# Patient Record
Sex: FEMALE | Race: WHITE | Employment: OTHER | URBAN - METROPOLITAN AREA
[De-identification: names, ages, dates, MRNs, and addresses within clinical notes are randomized per-mention and may not be internally consistent; named-entity substitution may affect disease eponyms.]

---

## 2018-02-13 ENCOUNTER — HOSPITAL ENCOUNTER (OUTPATIENT)
Dept: PET IMAGING | Age: 73
Discharge: HOME | End: 2018-02-13
Attending: INTERNAL MEDICINE
Payer: MEDICARE

## 2018-02-13 DIAGNOSIS — R91.1 COIN LESION: ICD-10-CM

## 2018-02-13 PROCEDURE — A9552 F18 FDG: HCPCS

## 2018-03-18 RX ORDER — ATROPINE SULFATE 0.1 MG/ML
0.5 INJECTION INTRAVENOUS
Status: CANCELLED | OUTPATIENT
Start: 2018-03-18 | End: 2018-03-18

## 2018-03-18 RX ORDER — EPINEPHRINE 0.1 MG/ML
1 INJECTION INTRACARDIAC; INTRAVENOUS
Status: CANCELLED | OUTPATIENT
Start: 2018-03-18 | End: 2018-03-18

## 2018-03-18 RX ORDER — DEXTROMETHORPHAN/PSEUDOEPHED 2.5-7.5/.8
1.2 DROPS ORAL
Status: CANCELLED | OUTPATIENT
Start: 2018-03-18

## 2018-03-19 ENCOUNTER — HOSPITAL ENCOUNTER (OUTPATIENT)
Age: 73
Setting detail: OUTPATIENT SURGERY
Discharge: HOME | End: 2018-03-19
Attending: INTERNAL MEDICINE | Admitting: INTERNAL MEDICINE
Payer: MEDICARE

## 2018-03-19 VITALS
HEIGHT: 60 IN | BODY MASS INDEX: 28.72 KG/M2 | OXYGEN SATURATION: 94 % | SYSTOLIC BLOOD PRESSURE: 117 MMHG | WEIGHT: 146.3 LBS | RESPIRATION RATE: 16 BRPM | TEMPERATURE: 97.5 F | DIASTOLIC BLOOD PRESSURE: 59 MMHG | HEART RATE: 86 BPM

## 2018-03-19 PROCEDURE — G0500 MOD SEDAT ENDO SERVICE >5YRS: HCPCS | Performed by: INTERNAL MEDICINE

## 2018-03-19 PROCEDURE — 76040000007: Performed by: INTERNAL MEDICINE

## 2018-03-19 PROCEDURE — 77030020256 HC SOL INJ NACL 0.9%  500ML: Performed by: INTERNAL MEDICINE

## 2018-03-19 PROCEDURE — 74011250636 HC RX REV CODE- 250/636

## 2018-03-19 PROCEDURE — 99153 MOD SED SAME PHYS/QHP EA: CPT | Performed by: INTERNAL MEDICINE

## 2018-03-19 PROCEDURE — 74011250636 HC RX REV CODE- 250/636: Performed by: INTERNAL MEDICINE

## 2018-03-19 RX ORDER — FLUMAZENIL 0.1 MG/ML
0.2 INJECTION INTRAVENOUS
Status: DISCONTINUED | OUTPATIENT
Start: 2018-03-19 | End: 2018-03-19 | Stop reason: HOSPADM

## 2018-03-19 RX ORDER — NALOXONE HYDROCHLORIDE 0.4 MG/ML
0.4 INJECTION, SOLUTION INTRAMUSCULAR; INTRAVENOUS; SUBCUTANEOUS
Status: DISCONTINUED | OUTPATIENT
Start: 2018-03-19 | End: 2018-03-19 | Stop reason: HOSPADM

## 2018-03-19 RX ORDER — FENTANYL CITRATE 50 UG/ML
100 INJECTION, SOLUTION INTRAMUSCULAR; INTRAVENOUS
Status: DISCONTINUED | OUTPATIENT
Start: 2018-03-19 | End: 2018-03-19 | Stop reason: HOSPADM

## 2018-03-19 RX ORDER — SODIUM CHLORIDE 9 MG/ML
100 INJECTION, SOLUTION INTRAVENOUS CONTINUOUS
Status: DISCONTINUED | OUTPATIENT
Start: 2018-03-19 | End: 2018-03-19 | Stop reason: HOSPADM

## 2018-03-19 RX ORDER — MIDAZOLAM HYDROCHLORIDE 1 MG/ML
.5-5 INJECTION, SOLUTION INTRAMUSCULAR; INTRAVENOUS
Status: DISCONTINUED | OUTPATIENT
Start: 2018-03-19 | End: 2018-03-19 | Stop reason: HOSPADM

## 2018-03-19 RX ADMIN — SODIUM CHLORIDE 100 ML/HR: 900 INJECTION, SOLUTION INTRAVENOUS at 09:57

## 2018-03-19 NOTE — CONSULTS
[address] [facility]  MR#: [id number]  : 1945  ACCOUNT #: [de-identified]   DATE OF SERVICE: 2018    HISTORY OF PRESENT ILLNESS:  This is a 59-year-old female who had been referred to me by Dr. Justice Murray for abnormal PET scan done on 2018 at [facility], which showed focal moderately intense radial trace accumulation within the right pelvis associated with sigmoid colon. The patient has never had any colonoscopy previously. She claims that she has 1 bowel movement every second day. She is not sure if she has rectal bleeding or melena because she has been completely blind since . She has no family history of colon cancer. She is a smoker of also almost a pack of cigarettes a day. She has COPD grade III with emphysema. She is otherwise in good health. She is still a smoker. She already had a previous cholecystectomy and eye surgeries. She denies having abdominal pain, dyspepsia, heartburn, nausea, vomiting or dysphagia. Her weight has been stable. It was initially thought that she may have some kind of a tumor in her lungs. She does not have diabetes, coronary artery disease, hypertension. She does not drink alcohol. ALLERGIES:  NO KNOWN DRUG ALLERGIES. HOME MEDICATIONS:  Mostly is Anoro Ellipta inhalation once daily. She denies any chest pain. No stroke, paralysis, numbness, loss of consciousness. PHYSICAL EXAMINATION  GENERAL:  We have a 59-year-old  female who appears to be in no distress. She is completely blind. VITAL SIGNS:  She weighs 146 pounds, temperature 96.3, pulse 90-94, blood pressure 96/74, breathing 23-16, saturation 94-96% on 2 liters per minute. SKIN:  Normal.  No evidence of any rash. HEENT:  Remarkable for multiple previous eye surgeries and scars. She is blind. The sclerae are anicteric. The pupils are equal, round and reactive to light.   The oropharyngeal cavity is very dry with pink mucous membrane. Normal teeth. NECK:  Supple. No palpable mass on auscultation. LUNGS:  Clear to auscultation. HEART:  Cardiac rhythm is regular. S1, S2 normal.  No murmur. ABDOMEN:  Nondistended, soft, nontender, no mass or organomegaly. Bowel sounds are normal.  EXTREMITIES:  Unremarkable. CONCLUSION:  This patient has an abnormal PET scan in the region of the right lower quadrant suggestive of mass in the sigmoid. We are going to do a colonoscopy as she never had one. Her CBC and occult blood in the stools have been normal or negative. I explained to the patient the procedure of colonoscopy and the risks involved, which include, but not limited to bleeding, perforation, reaction to medication or not being able to find anything or that she can miss something if the bowel prep has not been well done or that her bowels are unusually difficult and tortuous. She agreed to proceed. Further note to follow. NICK Britton MD MBE / CESARIO  D: 03/19/2018 11:14     T: 03/19/2018 12:10  JOB #: [id number]  CC: Carlo Barr MD  CC: Lo Crooks MD  CC: Lori You MD

## 2018-03-19 NOTE — H&P
Patient is a 67 y.o. female with a history of COPD was found to have a mas in the RLQ possibly related to the sigmoid on CT scan she is asymptomatic and never had colonoscopy. She has one bm every 2 days. Had previous cholecystectomy. Patient denied having dyspepsia nausea vomiting, dysphagia or abdominal pain, She lost 4 to 5 lbs weight loss after her   . She is a smoker one pack a day. Patient denies heavy ETOH or recent NSAIDS. Patient denies any known history of peptic ulcer disease or chronic liver disease. No recent upper endoscopy or colonoscopy. PET scan  shows metabolic activity with SUV of 9.9  in the right pelvis associated with the sigmoid colon. The patient has family history of colon cancer-dad and brother. She would need urgent GI evaluation for colonoscopy. CBC and stool for occult blood. Domitila Witt PET scan  shows metabolic activity with SUV of 9.9  in the right pelvis associated with the sigmoid colon. The patient has family history of colon cancer-dad and brother. She would need urgent GI evaluation for colonoscopy. I called and discussed with Dr. Woodard for urgent consultation. As requested, I would also get CBC and stool for occult blood. Were normal..

## 2018-03-19 NOTE — DISCHARGE INSTRUCTIONS
Urvashipalak Dears  [id number]  1945    COLON DISCHARGE INSTRUCTIONS    Discomfort:  Redness at IV site- apply warm compress to area; if redness or soreness persist- contact your physician  There may be a slight amount of blood passed from the rectum  Gaseous discomfort- walking, belching will help relieve any discomfort  You may not operate a vehicle til the next day. You may not engage in an occupation involving machinery or appliances til the next day. You may not drink alcoholic beverages til the next day. DIET:   High fiber diet. ACTIVITY:  You may not  resume your normal daily activities til the next day. it is recommended that you spend the remainder of the day resting -  avoid any strenuous activity. CALL M.D.  IF ANY SIGN OF:   Increasing pain, nausea, vomiting  Abdominal distension (swelling)  New increased bleeding (oral or rectal)  Fever (chills)  Pain in chest area  Bloody discharge from nose or mouth  Shortness of breath    You may not take any Advil, Aspirin, Ibuprofen, Motrin, Aleve, or Goodys ONLY  Tylenol as needed for pain. Post procedure diagnosis:  SKIN TAGS, HEMORRHOIDS, POOR PREP, DIVERTICULOSIS    Follow-up Instructions: Your follow up colonoscopy is questionable in 10 years. Slick Cueva MD  March 19, 2018       DISCHARGE SUMMARY from Nurse    PATIENT INSTRUCTIONS:    After general anesthesia or intravenous sedation, for 24 hours or while taking prescription Narcotics:  · Limit your activities  · Do not drive and operate hazardous machinery  · Do not make important personal or business decisions  · Do  not drink alcoholic beverages  · If you have not urinated within 8 hours after discharge, please contact your surgeon on call.     Report the following to your surgeon:  · Excessive pain, swelling, redness or odor of or around the surgical area  · Temperature over 100.5  · Nausea and vomiting lasting longer than 4 hours or if unable to take medications  · Any signs of decreased circulation or nerve impairment to extremity: change in color, persistent  numbness, tingling, coldness or increase pain  · Any questions    What to do at Home:  Recommended activity: as above. If you experience any of the following symptoms as above, please follow up with Dr. Oziel Jimenez. *  Please give a list of your current medications to your Primary Care Provider. *  Please update this list whenever your medications are discontinued, doses are      changed, or new medications (including over-the-counter products) are added. *  Please carry medication information at all times in case of emergency situations. These are general instructions for a healthy lifestyle:    No smoking/ No tobacco products/ Avoid exposure to second hand smoke  Surgeon General's Warning:  Quitting smoking now greatly reduces serious risk to your health. Obesity, smoking, and sedentary lifestyle greatly increases your risk for illness    A healthy diet, regular physical exercise & weight monitoring are important for maintaining a healthy lifestyle    You may be retaining fluid if you have a history of heart failure or if you experience any of the following symptoms:  Weight gain of 3 pounds or more overnight or 5 pounds in a week, increased swelling in our hands or feet or shortness of breath while lying flat in bed. Please call your doctor as soon as you notice any of these symptoms; do not wait until your next office visit. Recognize signs and symptoms of STROKE:    F-face looks uneven    A-arms unable to move or move unevenly    S-speech slurred or non-existent    T-time-call 911 as soon as signs and symptoms begin-DO NOT go       Back to bed or wait to see if you get better-TIME IS BRAIN. Warning Signs of HEART ATTACK     Call 911 if you have these symptoms:   Chest discomfort.  Most heart attacks involve discomfort in the center of the chest that lasts more than a few minutes, or that goes away and comes back. It can feel like uncomfortable pressure, squeezing, fullness, or pain.  Discomfort in other areas of the upper body. Symptoms can include pain or discomfort in one or both arms, the back, neck, jaw, or stomach.  Shortness of breath with or without chest discomfort.  Other signs may include breaking out in a cold sweat, nausea, or lightheadedness. Don't wait more than five minutes to call 911 - MINUTES MATTER! Fast action can save your life. Calling 911 is almost always the fastest way to get lifesaving treatment. Emergency Medical Services staff can begin treatment when they arrive -- up to an hour sooner than if someone gets to the hospital by car. The discharge information has been reviewed with the patient and caregiver. The patient and caregiver verbalized understanding. Discharge medications reviewed with the patient and caregiver and appropriate educational materials and side effects teaching were provided.   ___________________________________________________________________________________________________________________________________    Patient armband removed and shredded

## 2018-03-19 NOTE — IP AVS SNAPSHOT
[address] [address] 
[phone] Patient: Adela De La Paz MRN: [id number] EAF:1/81/7199 About your hospitalization You were admitted on:  March 19, 2018 You last received care in the:  [facility] ENDOSCOPY You were discharged on:  March 19, 2018 Why you were hospitalized Your primary diagnosis was:  Not on File Follow-up Information Follow up With Details Comments Contact Info Rohan Patel MD   [address] [address] 
[phone] Discharge Orders None A check elpidio indicates which time of day the medication should be taken. My Medications CONTINUE taking these medications Instructions Each Dose to Equal  
 Morning Noon Evening Bedtime ANORO ELLIPTA 62.5-25 mcg/actuation inhaler Generic drug:  umeclidinium-vilanterol Your last dose was: Your next dose is: Take 1 Puff by inhalation daily. 1 Puff Discharge Instructions Adela De La Paz [id number] 1945 COLON DISCHARGE INSTRUCTIONS Discomfort: 
Redness at IV site- apply warm compress to area; if redness or soreness persist- contact your physician There may be a slight amount of blood passed from the rectum Gaseous discomfort- walking, belching will help relieve any discomfort You may not operate a vehicle til the next day. You may not engage in an occupation involving machinery or appliances til the next day. You may not drink alcoholic beverages til the next day. DIET: 
 High fiber diet. ACTIVITY: 
You may not  resume your normal daily activities til the next day. it is recommended that you spend the remainder of the day resting -  avoid any strenuous activity. CALL MORGAN Bingham Dress ANY SIGN OF: Increasing pain, nausea, vomiting Abdominal distension (swelling) New increased bleeding (oral or rectal) Fever (chills) Pain in chest area Bloody discharge from nose or mouth Shortness of breath You may not take any Advil, Aspirin, Ibuprofen, Motrin, Aleve, or Goodys ONLY  Tylenol as needed for pain. Post procedure diagnosis:  SKIN TAGS, HEMORRHOIDS, POOR PREP, DIVERTICULOSIS Follow-up Instructions: Your follow up colonoscopy is questionable in 10 years. Ike Stephens MD 
March 19, 2018 DISCHARGE SUMMARY from Nurse PATIENT INSTRUCTIONS: 
 
 
F-face looks uneven A-arms unable to move or move unevenly S-speech slurred or non-existent T-time-call 911 as soon as signs and symptoms begin-DO NOT go Back to bed or wait to see if you get better-TIME IS BRAIN. Warning Signs of HEART ATTACK Call 911 if you have these symptoms: 
? Chest discomfort. Most heart attacks involve discomfort in the center of the chest that lasts more than a few minutes, or that goes away and comes back. It can feel like uncomfortable pressure, squeezing, fullness, or pain. ? Discomfort in other areas of the upper body. Symptoms can include pain or discomfort in one or both arms, the back, neck, jaw, or stomach. ? Shortness of breath with or without chest discomfort. ? Other signs may include breaking out in a cold sweat, nausea, or lightheadedness. Don't wait more than five minutes to call [address]! Fast action can save your life. Calling 911 is almost always the fastest way to get lifesaving treatment. Emergency Medical Services staff can begin treatment when they arrive  up to an hour sooner than if someone gets to the hospital by car. The discharge information has been reviewed with the patient and caregiver. The patient and caregiver verbalized understanding. Discharge medications reviewed with the patient and caregiver and appropriate educational materials and side effects teaching were provided. ___________________________________________________________________________________________________________________________________ Patient armband removed and shredded Introducing [facility]! Marie Deshpande introduces [employer] patient portal. Now you can access parts of your medical record, email your doctor's office, and request medication refills online. 1. In your internet browser, go to [email or web address] 2. Click on the First Time User? Click Here link in the Sign In box. You will see the New Member Sign Up page. 3. Enter your [employer] Access Code exactly as it appears below. You will not need to use this code after youve completed the sign-up process. If you do not sign up before the expiration date, you must request a new code. · [employer] Access Code: [email or web address] Expires: 5/1/2018  3:38 PM 
 
4. Enter the last four digits of your Social Security Number (xxxx) and Date of Birth (mm/dd/yyyy) as indicated and click Submit. You will be taken to the next sign-up page. 5. Create a [employer] ID. This will be your [employer] login ID and cannot be changed, so think of one that is secure and easy to remember. 6. Create a [employer] password. You can change your password at any time. 7. Enter your Password Reset Question and Answer. This can be used at a later time if you forget your password. 8. Enter your e-mail address. You will receive e-mail notification when new information is available in [address] 9. Click Sign Up. You can now view and download portions of your medical record. 10. Click the Download Summary menu link to download a portable copy of your medical information. If you have questions, please visit the Frequently Asked Questions section of the [employer] website.  Remember, [employer] is NOT to be used for urgent needs. For medical emergencies, dial 911. Now available from your iPhone and Android! Providers Seen During Your Hospitalization Provider Specialty Primary office phone Jodie Lilly MD Gastroenterology [phone] Your Primary Care Physician (PCP) Primary Care Physician Office Phone Office Fax Chino Wheeler [phone] You are allergic to the following No active allergies Recent Documentation Height Weight BMI OB Status Smoking Status 1.524 m 66.4 kg 28.57 kg/m2 Postmenopausal Current Every Day Smoker Emergency Contacts Name Discharge Info Relation Home Work Mobile Josiane Cabezas DISCHARGE CAREGIVER [3] Daughter [21]   [phone] Joanna Schmitt DISCHARGE CAREGIVER [3] Other Relative [6] [phone] Patient Belongings The following personal items are in your possession at time of discharge: 
  Dental Appliances: None  Visual Aid: None Please provide this summary of care documentation to your next provider. Signatures-by signing, you are acknowledging that this After Visit Summary has been reviewed with you and you have received a copy. Patient Signature:  ____________________________________________________________ Date:  ____________________________________________________________  
  
[facility] Provider Signature:  ____________________________________________________________ Date:  ____________________________________________________________

## 2018-03-19 NOTE — PROCEDURES
[facility]  Colonoscopy Procedure Report  _______________________________________________________  Patient: Karen Johnson                                         Attending Physician: Slick Cueva MD    Patient ID: [id number]                                      Referring Physician: Isaías Tolentino MD    Exam Date: March 19, 2018 ____________________________________________________    Introduction: A  67 y.o. female patient, presents for outpatient Colonoscopy    Indications: abnormal RLQ captation ion PET scan suggestive of sigmoid colon tumor? Patient has average risk and asymptomatic. Consent: The benefits, risks, and alternatives to the procedure were discussed and informed consent was obtained from the patient. Preparation: EKG, pulse, pulse oximetry and blood pressure were monitored throughout the procedure. ASA Classification: Class 2 - . The heart is an S1-S2 and regular heart rate and rhythm. Lungs are clear to auscultation and percussion. Abdomen is soft, nondistended, and nontender. Mental Status: awake, alert, and oriented to person, place, and time    Medications:  · Fentanyl 100 mcg IV before procedure. · Versed 5 mg IV throughout the procedure. Rectal Exam: Medium sized multiple anal skin tags. Poor bowel prep. There is presence of plastic orthesis or device inside the vagina. No Blood. Pathology Specimens: No specimens removed. Procedure: The colonoscope was passed with difficulty through the anus under direct visualization and advanced to the cecum and 4 cm inside the terminal ileum. The patient required positioning on the back to aid in the passage of the scope. The scope was withdrawn and the mucosa was carefully examined. The quality of the preparation was Poor bowel prep colon full of soft and liquid thick stools that had to be cleaned to the maximum. The views were good. The patient's toleration of the procedure was very good.  Retroflexion was preformed in the ascending colon and hepatic flexure. The exam was done twice to the cecum. Total time is 24 minutes and withdrawal time is 15 minutes. Findings:    Rectum:   Internal hemorrhoids  Sigmoid:   Very tortuous and fixed sigmoid with severe diverticulosis. There is an area of mucosal congestion caused from mucosal prolapse but no tumor or sign of diverticulitis. Descending Colon:   Normal  Transverse Colon:   Normal  Ascending Colon:   Normal  Cecum:   Normal  Terminal Ileum:   Normal      Unplanned Events: There were no unplanned events. Estimated Blood Loss: None  Impressions:  Medium sized multiple anal skin tags. Poor bowel prep colon full of soft and liquid thick stools that had to be cleaned to the maximum. Moderately large Internal hemorrhoids. There is presence of plastic orthesis or device inside the vagina. The sigmoid colon is extremely tortuous and fixed with severe sigmoid diverticulosis. There is an area of mucosal congestion caused from mucosal prolapse but no tumor or sign of diverticulitis. Normal Mucosa. No evidence of any tumor or polyps found. Complications: None; patient tolerated the procedure well. Recommendations:  · Discharge home when standard parameters are met. · Resume a high fiber diet. · Colonoscopy recommendation in 10 years.   · Recommend to take Miralax with stool softeners daily    Procedure Codes:    · COLONOSCOPY [[id number] (Type: Custom)]  Endoscope Information:  Model Number(s)    [id number]     Assistant: None    Signed By: Luz Maria Anderson MD Date: March 19, 2018

## 2021-07-12 ENCOUNTER — TRANSCRIBE ORDER (OUTPATIENT)
Dept: SCHEDULING | Age: 76
End: 2021-07-12

## 2021-07-12 DIAGNOSIS — R63.4 LOSS OF WEIGHT: Primary | ICD-10-CM

## 2021-12-21 ENCOUNTER — HOSPITAL ENCOUNTER (OUTPATIENT)
Dept: PREADMISSION TESTING | Age: 76
Discharge: HOME | End: 2021-12-21

## 2021-12-21 VITALS — WEIGHT: 105 LBS | HEIGHT: 59 IN | BODY MASS INDEX: 21.17 KG/M2

## 2021-12-21 RX ORDER — SODIUM CHLORIDE 9 MG/ML
500 INJECTION, SOLUTION INTRAVENOUS CONTINUOUS
Status: CANCELLED | OUTPATIENT
Start: 2021-12-21

## 2021-12-21 RX ORDER — ASPIRIN 81 MG/1
81 TABLET ORAL DAILY
Status: ON HOLD | COMMUNITY
End: 2022-01-07 | Stop reason: SDUPTHER

## 2021-12-21 NOTE — PERIOP NOTES
No sleep apnea, removable prosthetic devices or family history of malignant hyperthermia. PCP is aware of the situation but may not know she is having the procedure. . No participation in clinical trial or research study. Do not bring any valuables on DOS- jewelry, wallet, cash, laptop, medications. CDC guidelines reviewed. Does not meet criteria for special population at this time. Possible time delay day of surgery reviewed. Covid szdplol-05- along with her other testing- no ride until then. DNR status-none. Pt is legally blind.

## 2021-12-27 ENCOUNTER — HOSPITAL ENCOUNTER (OUTPATIENT)
Dept: PREADMISSION TESTING | Age: 76
Discharge: HOME | End: 2021-12-27

## 2021-12-30 ENCOUNTER — HOSPITAL ENCOUNTER (OUTPATIENT)
Dept: PREADMISSION TESTING | Age: 76
Discharge: HOME | End: 2021-12-30
Payer: MEDICARE

## 2021-12-30 LAB
ANION GAP SERPL CALC-SCNC: 2 MMOL/L (ref 3–18)
BUN SERPL-MCNC: 21 MG/DL (ref 7–18)
BUN/CREAT SERPL: 26 (ref 12–20)
CALCIUM SERPL-MCNC: 9.5 MG/DL (ref 8.5–10.1)
CHLORIDE SERPL-SCNC: 107 MMOL/L (ref 100–111)
CO2 SERPL-SCNC: 32 MMOL/L (ref 21–32)
CREAT SERPL-MCNC: 0.82 MG/DL (ref 0.6–1.3)
ERYTHROCYTE [DISTWIDTH] IN BLOOD BY AUTOMATED COUNT: 13.9 % (ref 11.6–14.5)
GLUCOSE SERPL-MCNC: 82 MG/DL (ref 74–99)
HCT VFR BLD AUTO: 34.2 % (ref 35–45)
HGB BLD-MCNC: 10.7 G/DL (ref 12–16)
INR PPP: 1.1 (ref 0.8–1.2)
MCH RBC QN AUTO: 32.2 PG (ref 24–34)
MCHC RBC AUTO-ENTMCNC: 31.3 G/DL (ref 31–37)
MCV RBC AUTO: 103 FL (ref 78–100)
NRBC # BLD: 0 K/UL (ref 0–0.01)
NRBC BLD-RTO: 0 PER 100 WBC
PLATELET # BLD AUTO: 235 K/UL (ref 135–420)
PMV BLD AUTO: 8.9 FL (ref 9.2–11.8)
POTASSIUM SERPL-SCNC: 3.8 MMOL/L (ref 3.5–5.5)
PROTHROMBIN TIME: 13.5 SEC (ref 11.5–15.2)
RBC # BLD AUTO: 3.32 M/UL (ref 4.2–5.3)
SODIUM SERPL-SCNC: 141 MMOL/L (ref 136–145)
WBC # BLD AUTO: 9.1 K/UL (ref 4.6–13.2)

## 2021-12-30 PROCEDURE — 85027 COMPLETE CBC AUTOMATED: CPT

## 2021-12-30 PROCEDURE — 85610 PROTHROMBIN TIME: CPT

## 2021-12-30 PROCEDURE — U0003 INFECTIOUS AGENT DETECTION BY NUCLEIC ACID (DNA OR RNA); SEVERE ACUTE RESPIRATORY SYNDROME CORONAVIRUS 2 (SARS-COV-2) (CORONAVIRUS DISEASE [COVID-19]), AMPLIFIED PROBE TECHNIQUE, MAKING USE OF HIGH THROUGHPUT TECHNOLOGIES AS DESCRIBED BY CMS-2020-01-R: HCPCS

## 2021-12-30 PROCEDURE — 36415 COLL VENOUS BLD VENIPUNCTURE: CPT

## 2021-12-30 PROCEDURE — 93005 ELECTROCARDIOGRAM TRACING: CPT

## 2021-12-30 PROCEDURE — 80048 BASIC METABOLIC PNL TOTAL CA: CPT

## 2021-12-31 LAB
ATRIAL RATE: 84 BPM
CALCULATED P AXIS, ECG09: 79 DEGREES
CALCULATED R AXIS, ECG10: 63 DEGREES
CALCULATED T AXIS, ECG11: 66 DEGREES
DIAGNOSIS, 93000: NORMAL
P-R INTERVAL, ECG05: 142 MS
Q-T INTERVAL, ECG07: 384 MS
QRS DURATION, ECG06: 84 MS
QTC CALCULATION (BEZET), ECG08: 453 MS
SARS-COV-2, COV2NT: NOT DETECTED
VENTRICULAR RATE, ECG03: 84 BPM

## 2022-01-03 ENCOUNTER — HOSPITAL ENCOUNTER (OUTPATIENT)
Age: 77
Setting detail: OUTPATIENT SURGERY
Discharge: HOME | End: 2022-01-04
Attending: INTERNAL MEDICINE | Admitting: INTERNAL MEDICINE

## 2022-01-03 VITALS
HEART RATE: 88 BPM | HEIGHT: 59 IN | BODY MASS INDEX: 21.27 KG/M2 | DIASTOLIC BLOOD PRESSURE: 65 MMHG | TEMPERATURE: 97.6 F | OXYGEN SATURATION: 98 % | RESPIRATION RATE: 19 BRPM | WEIGHT: 105.5 LBS | SYSTOLIC BLOOD PRESSURE: 143 MMHG

## 2022-01-03 RX ORDER — SODIUM CHLORIDE 9 MG/ML
500 INJECTION, SOLUTION INTRAVENOUS CONTINUOUS
Status: DISCONTINUED | OUTPATIENT
Start: 2022-01-03 | End: 2022-01-04 | Stop reason: HOSPADM

## 2022-01-03 NOTE — PERIOP NOTES
Spoke to pt about her schedule appoinment with Doctor [facility]DRAGON[facility] today at 56 am for EBUS procedure at 1230pm. Reinforce no smoking today, NPO status,  a ride home today and  encouraged not to bring any valuables.    Dimas Alaniz

## 2022-01-03 NOTE — PERIOP NOTES
Procedure cancelled in pre-op due to patient being unaware of discontinuing Asprin 3-5 days prior to procedure. Dr. Emerald Puga spoke with patient about the risk of continuing to preform the procedure with Asprin last taken 1/2/21 at 1300. Patient states she understands that the safest plan of care is to reschedule the EBUS for Thursday 1/6/22 at 1300 with an arrival time of 1100. Dr. Emerald Puga educated patient to be NPO after midnight 1/5/22 and Asprin discontinued until after the procedure is preformed on Thursday, niece witnessed all education and information given to the patient while at bedside. Patient and niece have no questions at this time.
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
## 2022-01-04 ENCOUNTER — APPOINTMENT (OUTPATIENT)
Dept: PREADMISSION TESTING | Age: 77
End: 2022-01-04
Attending: INTERNAL MEDICINE

## 2022-01-06 ENCOUNTER — APPOINTMENT (OUTPATIENT)
Dept: GENERAL RADIOLOGY | Age: 77
End: 2022-01-06
Attending: INTERNAL MEDICINE
Payer: MEDICARE

## 2022-01-06 ENCOUNTER — ANESTHESIA EVENT (OUTPATIENT)
Dept: ENDOSCOPY | Age: 77
End: 2022-01-06
Payer: MEDICARE

## 2022-01-06 ENCOUNTER — ANESTHESIA (OUTPATIENT)
Dept: ENDOSCOPY | Age: 77
End: 2022-01-06
Payer: MEDICARE

## 2022-01-06 ENCOUNTER — HOSPITAL ENCOUNTER (OUTPATIENT)
Age: 77
Setting detail: OBSERVATION
Discharge: HOME | End: 2022-01-07
Attending: INTERNAL MEDICINE | Admitting: HOSPITALIST
Payer: MEDICARE

## 2022-01-06 PROBLEM — Z99.81 HYPOXEMIA REQUIRING SUPPLEMENTAL OXYGEN: Status: ACTIVE | Noted: 2022-01-06

## 2022-01-06 PROBLEM — R91.8 LUNG MASS: Status: ACTIVE | Noted: 2020-01-01

## 2022-01-06 PROBLEM — I95.9 HYPOTENSION: Status: ACTIVE | Noted: 2020-01-01

## 2022-01-06 PROBLEM — R09.02 HYPOXEMIA REQUIRING SUPPLEMENTAL OXYGEN: Status: ACTIVE | Noted: 2022-01-06

## 2022-01-06 PROBLEM — H54.7 BLINDNESS: Status: ACTIVE | Noted: 2022-01-06

## 2022-01-06 PROBLEM — F17.200 SMOKER: Status: ACTIVE | Noted: 2020-01-01

## 2022-01-06 PROBLEM — J44.9 COPD (CHRONIC OBSTRUCTIVE PULMONARY DISEASE) (HCC): Status: ACTIVE | Noted: 2022-01-06

## 2022-01-06 LAB
ANION GAP SERPL CALC-SCNC: 4 MMOL/L (ref 3–18)
BASOPHILS # BLD: 0 K/UL (ref 0–0.1)
BASOPHILS NFR BLD: 0 % (ref 0–2)
BUN SERPL-MCNC: 20 MG/DL (ref 7–18)
BUN/CREAT SERPL: 24 (ref 12–20)
CALCIUM SERPL-MCNC: 9 MG/DL (ref 8.5–10.1)
CHLORIDE SERPL-SCNC: 112 MMOL/L (ref 100–111)
CO2 SERPL-SCNC: 26 MMOL/L (ref 21–32)
CREAT SERPL-MCNC: 0.84 MG/DL (ref 0.6–1.3)
DIFFERENTIAL METHOD BLD: ABNORMAL
EOSINOPHIL # BLD: 0 K/UL (ref 0–0.4)
EOSINOPHIL NFR BLD: 0 % (ref 0–5)
ERYTHROCYTE [DISTWIDTH] IN BLOOD BY AUTOMATED COUNT: 13.8 % (ref 11.6–14.5)
GLUCOSE SERPL-MCNC: 116 MG/DL (ref 74–99)
HCT VFR BLD AUTO: 36 % (ref 35–45)
HGB BLD-MCNC: 11 G/DL (ref 12–16)
IMM GRANULOCYTES # BLD AUTO: 0 K/UL (ref 0–0.04)
IMM GRANULOCYTES NFR BLD AUTO: 0 % (ref 0–0.5)
LYMPHOCYTES # BLD: 0.7 K/UL (ref 0.9–3.6)
LYMPHOCYTES NFR BLD: 11 % (ref 21–52)
MCH RBC QN AUTO: 32.4 PG (ref 24–34)
MCHC RBC AUTO-ENTMCNC: 30.6 G/DL (ref 31–37)
MCV RBC AUTO: 105.9 FL (ref 78–100)
MONOCYTES # BLD: 0 K/UL (ref 0.05–1.2)
MONOCYTES NFR BLD: 1 % (ref 3–10)
NEUTS SEG # BLD: 5.6 K/UL (ref 1.8–8)
NEUTS SEG NFR BLD: 88 % (ref 40–73)
NRBC # BLD: 0 K/UL (ref 0–0.01)
NRBC BLD-RTO: 0 PER 100 WBC
PLATELET # BLD AUTO: 255 K/UL (ref 135–420)
PMV BLD AUTO: 9.3 FL (ref 9.2–11.8)
POTASSIUM SERPL-SCNC: 4.6 MMOL/L (ref 3.5–5.5)
RBC # BLD AUTO: 3.4 M/UL (ref 4.2–5.3)
SODIUM SERPL-SCNC: 142 MMOL/L (ref 136–145)
TROPONIN-HIGH SENSITIVITY: 10 NG/L (ref 0–54)
WBC # BLD AUTO: 6.4 K/UL (ref 4.6–13.2)

## 2022-01-06 PROCEDURE — 77030040361 HC SLV COMPR DVT MDII -B: Performed by: INTERNAL MEDICINE

## 2022-01-06 PROCEDURE — 88177 CYTP FNA EVAL EA ADDL: CPT

## 2022-01-06 PROCEDURE — 74011000250 HC RX REV CODE- 250: Performed by: ANESTHESIOLOGY

## 2022-01-06 PROCEDURE — 87206 SMEAR FLUORESCENT/ACID STAI: CPT

## 2022-01-06 PROCEDURE — 71045 X-RAY EXAM CHEST 1 VIEW: CPT

## 2022-01-06 PROCEDURE — 77030019988 HC FCPS ENDOSC DISP BSC -B: Performed by: INTERNAL MEDICINE

## 2022-01-06 PROCEDURE — 77030008683 HC TU ET CUF COVD -A: Performed by: STUDENT IN AN ORGANIZED HEALTH CARE EDUCATION/TRAINING PROGRAM

## 2022-01-06 PROCEDURE — 80048 BASIC METABOLIC PNL TOTAL CA: CPT

## 2022-01-06 PROCEDURE — 77030008477 HC STYL SATN SLP COVD -A: Performed by: STUDENT IN AN ORGANIZED HEALTH CARE EDUCATION/TRAINING PROGRAM

## 2022-01-06 PROCEDURE — 88172 CYTP DX EVAL FNA 1ST EA SITE: CPT

## 2022-01-06 PROCEDURE — G0378 HOSPITAL OBSERVATION PER HR: HCPCS

## 2022-01-06 PROCEDURE — 74011250637 HC RX REV CODE- 250/637: Performed by: HOSPITALIST

## 2022-01-06 PROCEDURE — 87252 VIRUS INOCULATION TISSUE: CPT

## 2022-01-06 PROCEDURE — 87102 FUNGUS ISOLATION CULTURE: CPT

## 2022-01-06 PROCEDURE — 77030022556 HC FCPS BIOP TIS ENDOSC BSC -B: Performed by: INTERNAL MEDICINE

## 2022-01-06 PROCEDURE — 74011250636 HC RX REV CODE- 250/636: Performed by: STUDENT IN AN ORGANIZED HEALTH CARE EDUCATION/TRAINING PROGRAM

## 2022-01-06 PROCEDURE — 84484 ASSAY OF TROPONIN QUANT: CPT

## 2022-01-06 PROCEDURE — 77030003406 HC NDL ASPIR BIOP OCOA -C: Performed by: INTERNAL MEDICINE

## 2022-01-06 PROCEDURE — 87077 CULTURE AEROBIC IDENTIFY: CPT

## 2022-01-06 PROCEDURE — 36415 COLL VENOUS BLD VENIPUNCTURE: CPT

## 2022-01-06 PROCEDURE — 77030013079 HC BLNKT BAIR HGGR 3M -A: Performed by: STUDENT IN AN ORGANIZED HEALTH CARE EDUCATION/TRAINING PROGRAM

## 2022-01-06 PROCEDURE — 77030006643: Performed by: STUDENT IN AN ORGANIZED HEALTH CARE EDUCATION/TRAINING PROGRAM

## 2022-01-06 PROCEDURE — 88112 CYTOPATH CELL ENHANCE TECH: CPT

## 2022-01-06 PROCEDURE — 77030003454 HC NDL BIOP BRONCH BSC -B: Performed by: INTERNAL MEDICINE

## 2022-01-06 PROCEDURE — 76060000033 HC ANESTHESIA 1 TO 1.5 HR: Performed by: INTERNAL MEDICINE

## 2022-01-06 PROCEDURE — 2709999900 HC NON-CHARGEABLE SUPPLY: Performed by: INTERNAL MEDICINE

## 2022-01-06 PROCEDURE — 87186 SC STD MICRODIL/AGAR DIL: CPT

## 2022-01-06 PROCEDURE — 87070 CULTURE OTHR SPECIMN AEROBIC: CPT

## 2022-01-06 PROCEDURE — 85025 COMPLETE CBC W/AUTO DIFF WBC: CPT

## 2022-01-06 PROCEDURE — 77030012699 HC VLV SUC CNTRL OCOA -A: Performed by: INTERNAL MEDICINE

## 2022-01-06 PROCEDURE — 96375 TX/PRO/DX INJ NEW DRUG ADDON: CPT

## 2022-01-06 PROCEDURE — 77030003400 HC NDL ASPIR BIOP CNMD -B: Performed by: INTERNAL MEDICINE

## 2022-01-06 PROCEDURE — 74011000258 HC RX REV CODE- 258: Performed by: INTERNAL MEDICINE

## 2022-01-06 PROCEDURE — 65270000029 HC RM PRIVATE

## 2022-01-06 PROCEDURE — 76040000008: Performed by: INTERNAL MEDICINE

## 2022-01-06 PROCEDURE — 94640 AIRWAY INHALATION TREATMENT: CPT

## 2022-01-06 PROCEDURE — 74011000250 HC RX REV CODE- 250: Performed by: STUDENT IN AN ORGANIZED HEALTH CARE EDUCATION/TRAINING PROGRAM

## 2022-01-06 PROCEDURE — 74011000250 HC RX REV CODE- 250: Performed by: HOSPITALIST

## 2022-01-06 PROCEDURE — 74011000250 HC RX REV CODE- 250: Performed by: INTERNAL MEDICINE

## 2022-01-06 PROCEDURE — 88173 CYTOPATH EVAL FNA REPORT: CPT

## 2022-01-06 PROCEDURE — 88305 TISSUE EXAM BY PATHOLOGIST: CPT

## 2022-01-06 PROCEDURE — 77030018823 HC SLV COMPR VENO -B: Performed by: INTERNAL MEDICINE

## 2022-01-06 PROCEDURE — 74011250636 HC RX REV CODE- 250/636: Performed by: INTERNAL MEDICINE

## 2022-01-06 PROCEDURE — 96374 THER/PROPH/DIAG INJ IV PUSH: CPT

## 2022-01-06 RX ORDER — IPRATROPIUM BROMIDE AND ALBUTEROL SULFATE 2.5; .5 MG/3ML; MG/3ML
3 SOLUTION RESPIRATORY (INHALATION)
Status: DISCONTINUED | OUTPATIENT
Start: 2022-01-06 | End: 2022-01-07 | Stop reason: HOSPADM

## 2022-01-06 RX ORDER — ACETAMINOPHEN 650 MG/1
650 SUPPOSITORY RECTAL
Status: DISCONTINUED | OUTPATIENT
Start: 2022-01-06 | End: 2022-01-07 | Stop reason: HOSPADM

## 2022-01-06 RX ORDER — SODIUM CHLORIDE 0.9 % (FLUSH) 0.9 %
5-40 SYRINGE (ML) INJECTION EVERY 8 HOURS
Status: DISCONTINUED | OUTPATIENT
Start: 2022-01-06 | End: 2022-01-07 | Stop reason: SDUPTHER

## 2022-01-06 RX ORDER — MIDODRINE HYDROCHLORIDE 2.5 MG/1
5 TABLET ORAL 3 TIMES DAILY
Status: DISCONTINUED | OUTPATIENT
Start: 2022-01-06 | End: 2022-01-07 | Stop reason: HOSPADM

## 2022-01-06 RX ORDER — SODIUM CHLORIDE 0.9 % (FLUSH) 0.9 %
5-40 SYRINGE (ML) INJECTION AS NEEDED
Status: DISCONTINUED | OUTPATIENT
Start: 2022-01-06 | End: 2022-01-07 | Stop reason: SDUPTHER

## 2022-01-06 RX ORDER — SODIUM CHLORIDE 0.9 % (FLUSH) 0.9 %
5-40 SYRINGE (ML) INJECTION EVERY 8 HOURS
Status: DISCONTINUED | OUTPATIENT
Start: 2022-01-06 | End: 2022-01-07 | Stop reason: HOSPADM

## 2022-01-06 RX ORDER — MIDAZOLAM HYDROCHLORIDE 1 MG/ML
INJECTION INTRAMUSCULAR; INTRAVENOUS AS NEEDED
Status: DISCONTINUED | OUTPATIENT
Start: 2022-01-06 | End: 2022-01-06 | Stop reason: HOSPADM

## 2022-01-06 RX ORDER — LIDOCAINE HYDROCHLORIDE 20 MG/ML
INJECTION, SOLUTION EPIDURAL; INFILTRATION; INTRACAUDAL; PERINEURAL AS NEEDED
Status: DISCONTINUED | OUTPATIENT
Start: 2022-01-06 | End: 2022-01-06 | Stop reason: HOSPADM

## 2022-01-06 RX ORDER — INSULIN LISPRO 100 [IU]/ML
INJECTION, SOLUTION INTRAVENOUS; SUBCUTANEOUS ONCE
Status: CANCELLED | OUTPATIENT
Start: 2022-01-06 | End: 2022-01-06

## 2022-01-06 RX ORDER — MAGNESIUM SULFATE 100 %
4 CRYSTALS MISCELLANEOUS AS NEEDED
Status: CANCELLED | OUTPATIENT
Start: 2022-01-06

## 2022-01-06 RX ORDER — ONDANSETRON 2 MG/ML
4 INJECTION INTRAMUSCULAR; INTRAVENOUS
Status: DISCONTINUED | OUTPATIENT
Start: 2022-01-06 | End: 2022-01-07 | Stop reason: HOSPADM

## 2022-01-06 RX ORDER — SODIUM CHLORIDE 9 MG/ML
75 INJECTION, SOLUTION INTRAVENOUS CONTINUOUS
Status: DISCONTINUED | OUTPATIENT
Start: 2022-01-06 | End: 2022-01-06 | Stop reason: HOSPADM

## 2022-01-06 RX ORDER — FACIAL-BODY WIPES
10 EACH TOPICAL DAILY PRN
Status: DISCONTINUED | OUTPATIENT
Start: 2022-01-06 | End: 2022-01-07 | Stop reason: HOSPADM

## 2022-01-06 RX ORDER — PROMETHAZINE HYDROCHLORIDE 25 MG/1
12.5 TABLET ORAL
Status: DISCONTINUED | OUTPATIENT
Start: 2022-01-06 | End: 2022-01-07 | Stop reason: HOSPADM

## 2022-01-06 RX ORDER — FLUMAZENIL 0.1 MG/ML
0.2 INJECTION INTRAVENOUS
Status: DISCONTINUED | OUTPATIENT
Start: 2022-01-06 | End: 2022-01-07 | Stop reason: HOSPADM

## 2022-01-06 RX ORDER — ONDANSETRON 2 MG/ML
INJECTION INTRAMUSCULAR; INTRAVENOUS AS NEEDED
Status: DISCONTINUED | OUTPATIENT
Start: 2022-01-06 | End: 2022-01-06 | Stop reason: HOSPADM

## 2022-01-06 RX ORDER — SODIUM CHLORIDE 9 MG/ML
125 INJECTION, SOLUTION INTRAVENOUS CONTINUOUS
Status: DISCONTINUED | OUTPATIENT
Start: 2022-01-06 | End: 2022-01-06

## 2022-01-06 RX ORDER — SUCCINYLCHOLINE CHLORIDE 100 MG/5ML
SYRINGE (ML) INTRAVENOUS AS NEEDED
Status: DISCONTINUED | OUTPATIENT
Start: 2022-01-06 | End: 2022-01-06 | Stop reason: HOSPADM

## 2022-01-06 RX ORDER — NALOXONE HYDROCHLORIDE 0.4 MG/ML
0.1 INJECTION, SOLUTION INTRAMUSCULAR; INTRAVENOUS; SUBCUTANEOUS AS NEEDED
Status: DISCONTINUED | OUTPATIENT
Start: 2022-01-06 | End: 2022-01-07 | Stop reason: HOSPADM

## 2022-01-06 RX ORDER — DEXAMETHASONE SODIUM PHOSPHATE 4 MG/ML
INJECTION, SOLUTION INTRA-ARTICULAR; INTRALESIONAL; INTRAMUSCULAR; INTRAVENOUS; SOFT TISSUE AS NEEDED
Status: DISCONTINUED | OUTPATIENT
Start: 2022-01-06 | End: 2022-01-06 | Stop reason: HOSPADM

## 2022-01-06 RX ORDER — PROCHLORPERAZINE EDISYLATE 5 MG/ML
5 INJECTION INTRAMUSCULAR; INTRAVENOUS ONCE
Status: DISPENSED | OUTPATIENT
Start: 2022-01-06 | End: 2022-01-06

## 2022-01-06 RX ORDER — DEXTROSE 50 % IN WATER (D50W) INTRAVENOUS SYRINGE
25-50 AS NEEDED
Status: CANCELLED | OUTPATIENT
Start: 2022-01-06

## 2022-01-06 RX ORDER — SODIUM CHLORIDE, SODIUM LACTATE, POTASSIUM CHLORIDE, CALCIUM CHLORIDE 600; 310; 30; 20 MG/100ML; MG/100ML; MG/100ML; MG/100ML
INJECTION, SOLUTION INTRAVENOUS
Status: DISCONTINUED | OUTPATIENT
Start: 2022-01-06 | End: 2022-01-06 | Stop reason: HOSPADM

## 2022-01-06 RX ORDER — ACETAMINOPHEN 325 MG/1
650 TABLET ORAL
Status: DISCONTINUED | OUTPATIENT
Start: 2022-01-06 | End: 2022-01-07 | Stop reason: HOSPADM

## 2022-01-06 RX ORDER — PROPOFOL 10 MG/ML
INJECTION, EMULSION INTRAVENOUS AS NEEDED
Status: DISCONTINUED | OUTPATIENT
Start: 2022-01-06 | End: 2022-01-06 | Stop reason: HOSPADM

## 2022-01-06 RX ORDER — FENTANYL CITRATE 50 UG/ML
50 INJECTION, SOLUTION INTRAMUSCULAR; INTRAVENOUS AS NEEDED
Status: DISCONTINUED | OUTPATIENT
Start: 2022-01-06 | End: 2022-01-07 | Stop reason: HOSPADM

## 2022-01-06 RX ORDER — SODIUM CHLORIDE 0.9 % (FLUSH) 0.9 %
5-40 SYRINGE (ML) INJECTION AS NEEDED
Status: DISCONTINUED | OUTPATIENT
Start: 2022-01-06 | End: 2022-01-07 | Stop reason: HOSPADM

## 2022-01-06 RX ADMIN — IPRATROPIUM BROMIDE AND ALBUTEROL SULFATE 3 ML: .5; 3 SOLUTION RESPIRATORY (INHALATION) at 22:07

## 2022-01-06 RX ADMIN — MIDODRINE HYDROCHLORIDE 5 MG: 2.5 TABLET ORAL at 21:31

## 2022-01-06 RX ADMIN — METHYLPREDNISOLONE SODIUM SUCCINATE 40 MG: 40 INJECTION, POWDER, FOR SOLUTION INTRAMUSCULAR; INTRAVENOUS at 19:36

## 2022-01-06 RX ADMIN — SODIUM CHLORIDE 125 ML/HR: 9 INJECTION, SOLUTION INTRAVENOUS at 11:49

## 2022-01-06 RX ADMIN — PROPOFOL 100 MG: 10 INJECTION, EMULSION INTRAVENOUS at 13:20

## 2022-01-06 RX ADMIN — DEXAMETHASONE SODIUM PHOSPHATE 8 MG: 4 INJECTION, SOLUTION INTRAMUSCULAR; INTRAVENOUS at 13:26

## 2022-01-06 RX ADMIN — Medication 60 MG: at 13:20

## 2022-01-06 RX ADMIN — SODIUM CHLORIDE, SODIUM LACTATE, POTASSIUM CHLORIDE, AND CALCIUM CHLORIDE: 600; 310; 30; 20 INJECTION, SOLUTION INTRAVENOUS at 13:28

## 2022-01-06 RX ADMIN — CEFTRIAXONE 1 G: 1 INJECTION, POWDER, FOR SOLUTION INTRAMUSCULAR; INTRAVENOUS at 17:15

## 2022-01-06 RX ADMIN — ONDANSETRON HYDROCHLORIDE 4 MG: 2 INJECTION INTRAMUSCULAR; INTRAVENOUS at 14:08

## 2022-01-06 RX ADMIN — AZITHROMYCIN DIHYDRATE 500 MG: 500 INJECTION, POWDER, LYOPHILIZED, FOR SOLUTION INTRAVENOUS at 19:36

## 2022-01-06 RX ADMIN — SODIUM CHLORIDE, PRESERVATIVE FREE 10 ML: 5 INJECTION INTRAVENOUS at 21:32

## 2022-01-06 RX ADMIN — LIDOCAINE HYDROCHLORIDE 60 MG: 20 INJECTION, SOLUTION EPIDURAL; INFILTRATION; INTRACAUDAL; PERINEURAL at 13:20

## 2022-01-06 RX ADMIN — MIDAZOLAM HYDROCHLORIDE 2 MG: 1 INJECTION, SOLUTION INTRAMUSCULAR; INTRAVENOUS at 13:13

## 2022-01-06 NOTE — H&P
HP    Please see full consultation note from last week during office visit; this consultation note has been provided for scanning to patient's chart. Patient with COPD, suspicious findings on PET scan for lung cancer, being evaluated for bronchoscopy. PET scan [facility] at [facility] reviewed. Recent blood work reviewed. SARS-CoV-2 negative. Vaccination status-not vaccinated. No new respiratory symptoms of concern. COPD seems to be stable at baseline. Active smoker. Patient not needing home O2 therapy. Vital signs stable. Patient examined-normal heart sounds; few scattered wheezes heard at baseline; normal respirations on room air. 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 Patient being evaluated by GI for rectal mass and sigmoidoscopy. N.p.o. status. Aspirin stopped 5 days ago. PLAN  · Planned for EBUS bronchoscopy, lymph node biopsies and bronchial lavage under general anesthesia was reviewed and discussed. Discussed rare risks of bleeding, risks of cardiorespiratory complications with such procedure in patients with COPD, hypoxemia, arrhythmias etc.; discussed about risks of mechanical ventilation and hospital admission; discussed about current diet bed situation in the hospital; patient and daughter do not want delay any lung cancer diagnosis. Patient agreeable for procedure and informed consent signed by patient's daughter Nayely Velasco. PET scan 12/1/2021-Tc   Findings  FINDINGS:   Reference mediastinal vascular uptake Max SUV= 2.5   Reference background liver uptake Max SUV= 3.0   HEAD/NECK:   Normal activity in the visualized portions of the lower brain and in all regions of the neck. CHEST:   Intense wedge-shaped hyperintensity with underlying soft tissue density extending from the posterior right upper lobe cavitary lesion to the right suprahilar region, with maximum SUV of 6.2. It measures approximately 2.3 x 3.0 cm.    A ill-defined right hilar node measures approximately 1.1 cm demonstrates intense metabolic activity with maximum SUV of 5.3.   A 2.3 x 1.1 cm spiculated pulmonary nodule in the right anterior lung base in the anterior right lower lobe demonstrates increased metabolic activity with maximum SUV 3.3.   A 1.9 x 1.6 cm pulmonary nodule in the left lower lobe with increased minimum metabolic activity maximum SUV 1.5, similar compared to reference study on 10/7/2021 but increased since 7/18/2020. There is associated pleural thickening identified in the anteroinferior portion and posterosuperior portion of the right lung. Advanced centrilobular emphysema with bullous change in the right lung apex. Additional findings: Coronary calcification. ABDOMEN/PELVIS:   Intense focal radiotracer uptake identified in the lower portion of the rectum with associated asymmetrical left posterior wall thickening, maximum SUV of 6.3. There is suspected luminal narrowing probably resulting in large amount of stool in the rectum. Additional findings: Sigmoid diverticulosis without diverticulitis. Infrarenal abdominal aorta maximum diameter 2.4 x 2.9 cm. A pessary device is identified in the pelvis. SKELETON:   No suspicious FDG activity. Impression Chest: Intense hypermetabolic nodules identified in the apex of the right upper lobe, right hilum, and right lower lobe, suspicious for metastatic disease. Additional mildly metabolic active nodule in the left lower lobe, neoplasm not excluded. Abdomen/pelvis: Focal intense radiotracer accumulation with associate soft tissue thickening involving the rectum causing luminal narrowing and significant amount of rectal stool retention, underlying neoplasm suspected. Recommend sigmoidoscopy.        Marcela Live MD

## 2022-01-06 NOTE — PROGRESS NOTES
Pulmonary      Visit Vitals  BP (!) 107/41   Pulse 94   Temp 99 °F (37.2 °C)   Resp 16   Ht 4' 11\" (1.499 m)   Wt 47.9 kg (105 lb 8 oz)   SpO2 94%   Breastfeeding No   BMI 21.31 kg/m²       Patient examined in PACU  Still sleepy; on 2 L nasal cannula oxygen  No chest pains or hemoptysis-  Lungs symmetrical breath sounds no significant wheezes; scattered right lung crackles    CXR reviewed: Portable film-bilateral emphysematous and fibrotic lungs; more interstitial changes in the lungs on the right side compared to chest x-ray from June 2020 at [facility]. Await patient to wake up more; incentive spirometry as tolerated; wean off nasal cannula oxygen. Subsequently, if stable okay to go home; otherwise consider bringing patient for 24 hours observation.     Arcelia Segura MD

## 2022-01-06 NOTE — PERIOP NOTES
Attempted to call report to [address] x 3, will transfer patient and give bedside report    CB from [facility], report given to AARON Ware. Patient remains on oxygen 2L via NC, patient remains sleepy but will respond appropriately. Lynn MONTES notified ABX is running and to review orders.     Patient transferred via stretcher and with oxygen at 2L via NC, to room 312

## 2022-01-06 NOTE — H&P
History & Physical    Patient: Radha Squires MRN: [id number]  CSN: [id number]    YOB: 1945  Age: 68 y.o. Sex: female      DOA: 1/6/2022  Primary Care Provider:  Arleen Davila DO      Assessment/Plan     Hospital Problems  Never Reviewed          Codes Class Noted POA    COPD (chronic obstructive pulmonary disease) ([facility] [address] ICD-10-CM: J44.9  ICD-9-CM: 473  1/6/2022 Unknown        Hypoxemia requiring supplemental oxygen ICD-10-CM: R09.02, Z99.81  ICD-9-CM: 799.02  1/6/2022 Unknown        Blindness ICD-10-CM: H54.7  ICD-9-CM: 369.00  1/6/2022 Unknown        Hypotension ICD-10-CM: I95.9  ICD-9-CM: 458.9  2020 Unknown        Smoker ICD-10-CM: F17.200  ICD-9-CM: 305.1  2020 Unknown        Lung mass ICD-10-CM: R91.8  ICD-9-CM: 786.6  2020 Unknown                Admit to medical     Lung mass with hypoxia  Postprocedure  Continue monitoring  Pathology pending  Oxygen supplement-wean as tolerated    COPD  Breathing treatment, wean off oxygen  IV steroid    Hypotension-chronic  Continue home medication midodrine    Smoker  Recommend stop smoking  Nicotine patch    Blindness   Fall precaution/aspiration precaution       Full code   Please note that this dictation was completed with [employer] Windows, the Gizmo5 voice recognition software. Quite often unanticipated grammatical, syntax, homophones, and other interpretive errors are inadvertently transcribed by the computer software. Please disregard these errors. Please excuse any errors that have escaped final proofreading    Estimate  length of stay : 1-2 days     DVT : scd ppi proph  CC: feel fine        HPI:     Radha Squires is a 68 y.o. female with lung mass, chronic hypotension, COPD smoker as a direct admit after [facility] procedure. She received the procedure and tolerated bar well she presented to hypoxia postprocedure-2 L. Pulmonologist recommended overnight admission try to wean off oxygen. She lives alone, she took herself very well.   She is legally blindness. She denies any shortness of breath no chest pain. Visit Vitals  BP (!) 112/48   Pulse 90   Temp 99 °F (37.2 °C)   Resp 16   Ht 4' 11\" (1.499 m)   Wt 47.9 kg (105 lb 8 oz)   SpO2 95%   Breastfeeding No   BMI 21.31 kg/m²    O2 Flow Rate (L/min): 2 l/min O2 Device: Nasal cannula      Past Medical History:   Diagnosis Date    Arrhythmia     a-fib, history    Asthma     Cancer ([address] 10/2021    right lung    Chronic obstructive pulmonary disease (HCC)     Hypotension 2020    Ill-defined condition 1988    PATIENT IS BLIND    Ill-defined condition     has a pessary    OA (osteoarthritis)        Past Surgical History:   Procedure Laterality Date    COLONOSCOPY N/A 3/19/2018    COLONOSCOPY  performed by Judith Richard MD at [facility] END[facility]COPY    HX HEENT      numerous eye surgeries    HX LAP CHOLECYSTECTOMY      HX TUBAL LIGATION Bilateral      fhx vision issue-mother and grand mother       Social History     Socioeconomic History    Marital status:    Tobacco Use    Smoking status: Current Every Day Smoker     Packs/day: 0.75     Years: 58.00     Pack years: 43.50    Smokeless tobacco: Never Used    Tobacco comment: No smoking 24 hours prior to EBUS   Substance and Sexual Activity    Alcohol use: No    Drug use: No       Prior to Admission medications    Medication Sig Start Date End Date Taking? Authorizing Provider   fluticasone/umeclidin/vilanter (TRELEGY ELLIPTA IN) Take 1 Puff by inhalation daily. Indications: COPD   Yes Provider, Historical   midodrine HCl (MIDODRINE PO) Take 5 mg by mouth three (3) times daily. Indications: hypotension   Yes Provider, Historical   aspirin delayed-release 81 mg tablet Take 81 mg by mouth daily. Provider, Historical       Allergies   Allergen Reactions    Sulfa (Sulfonamide Antibiotics) Shortness of Breath       Review of Systems  Gen: No fever, chills, malaise, weight loss/gain.    Heent: No headache, rhinorrhea, epistaxis, ear pain, hearing loss, sinus pain, neck pain/stiffness, sore throat. Heart: No chest pain, palpitations, PINA, pnd, or orthopnea. Resp: No cough, hemoptysis, wheezing and shortness of breath. GI: No nausea, vomiting, diarrhea, constipation, melena or hematochezia. : No urinary obstruction, dysuria or hematuria. Derm: No rash, new skin lesion or pruritis. Musc/skeletal: no bone or joint complains. Vasc: No edema, cyanosis or claudication. Endo: No heat/cold intolerance, no polyuria,polydipsia or polyphagia. Neuro: No unilateral weakness, numbness, tingling. No seizures. Heme: No easy bruising or bleeding. Physical Exam:     Physical Exam:  Visit Vitals  BP (!) 112/48   Pulse 90   Temp 99 °F (37.2 °C)   Resp 16   Ht 4' 11\" (1.499 m)   Wt 47.9 kg (105 lb 8 oz)   SpO2 95%   Breastfeeding No   BMI 21.31 kg/m²    O2 Flow Rate (L/min): 2 l/min O2 Device: Nasal cannula    Temp (24hrs), Av.9 °F (37.2 °C), Min:98.8 °F (37.1 °C), Max:99 °F (37.2 °C)     0701 -  1900  In: 1400 [I.V.:1400]  Out: -    No intake/output data recorded. General:  Awake, cooperative, no distress. Head:  Normocephalic, without obvious abnormality, atraumatic. Eyes:  Conjunctivae/corneas clear, sclera anicteric, PERRL, EOMs intact. Nose: Nares normal. No drainage or sinus tenderness. Throat: Lips, mucosa, and tongue normal. .   Neck: Supple, symmetrical, trachea midline, no adenopathy. Lungs:   Clear to auscultation bilaterally. Heart:  Regular rate and rhythm, S1, S2 normal, no murmur, click, rub or gallop. Abdomen: Soft, non-tender. Bowel sounds normal. No masses,  No organomegaly. Extremities: Extremities normal, atraumatic, no cyanosis or edema. Pulses: 2+ and symmetric all extremities. Skin: Skin color-pink, texture, turgor normal. No rashes or lesions. Capillary refill normal    Neurologic: CNII-XII intact. No focal motor or sensory deficit.        Labs Reviewed:  Lab pending   BMP: No results found for: NA, K, CL, CO2, AGAP, GLU, BUN, CREA, GFRAA, GFRNA  CMP: No results found for: NA, K, CL, CO2, AGAP, GLU, BUN, CREA, GFRAA, GFRNA, CA, MG, PHOS, ALB, TBIL, TP, ALB, GLOB, AGRAT, ALT  CBC: No results found for: WBC, HGB, HGBEXT, HCT, HCTEXT, PLT, PLTEXT, HGBEXT, HCTEXT, PLTEXT  All Cardiac Markers in the last 24 hours: No results found for: CPK, CK, CKMMB, CKMB, RCK3, CKMBT, CKNDX, CKND1, PAULIE, TROPT, TROIQ, MARIA GUADALUPE, TROPT, TNIPOC, BNP, BNPP  Recent Glucose Results: No results found for: GLU  ABG: No results found for: PH, PHI, PCO2, PCO2I, PO2, PO2I, HCO3, HCO3I, FIO2, FIO2I  COAGS: No results found for: APTT, PTP, INR, INREXT, INREXT  Liver Panel: No results found for: ALB, CBIL, TBIL, TP, GLOB, AGRAT, ASTPOC, ALTPOC, ALT, AP  Pancreatic Markers: No results found for: AMYLPOCT, AML, LIPPOCT, LPSE    XR CHEST PORT    Result Date: 1/6/2022  EXAM: XR CHEST PORT CLINICAL INDICATION/HISTORY: S/p bronch and pretrach node aspirates; NO Lung biopsies done; RT UL BAL. -Additional: None COMPARISON: 12/20/2017 TECHNIQUE: Frontal view of the chest _______________ FINDINGS: HEART AND MEDIASTINUM: Normal cardiac size and mediastinal contours. LUNGS AND PLEURAL SPACES: Extensive bilateral parenchymal/interstitial opacities, right greater than left. No pneumothorax. BONY THORAX AND SOFT TISSUES: No acute osseous abnormality _______________     Extensive bilateral parenchymal/interstitial opacities, right greater than left. No pneumothorax.     Procedures/imaging: see electronic medical records for all procedures/Xrays and details which were not copied into this note but were reviewed prior to creation of Jami Gann MD, Internal Medicine     CC: Connie Ford DO

## 2022-01-06 NOTE — ANESTHESIA POSTPROCEDURE EVALUATION
Procedure(s):  ENDOSCOPIC BRONCHOSCOPY ULTRASOUND (EBUS) WITH C-ARM \"SPEC POP\"  BRONCHOSCOPY. general    Anesthesia Post Evaluation        Comments: Post-Anesthesia Evaluation and Assessment    Cardiovascular Function/Vital Signs  BP (!) 115/45   Pulse 91   Temp 37.2 °C (99 °F)   Resp 12   Ht 4' 11\" (1.499 m)   Wt 47.9 kg (105 lb 8 oz)   SpO2 95%   Breastfeeding No   BMI 21.31 kg/m²     Patient is status post Procedure(s):  ENDOSCOPIC BRONCHOSCOPY ULTRASOUND (EBUS) WITH C-ARM \"SPEC POP\"  BRONCHOSCOPY. Nausea/Vomiting: Controlled. Postoperative hydration reviewed and adequate. Pain:  Pain Scale 1: Visual (01/06/22 1645)  Pain Intensity 1: 0 (01/06/22 1645)   Managed. Neurological Status: At baseline. Mental Status and Level of Consciousness: Arousable. Pulmonary Status:   O2 Device: Nasal cannula (01/06/22 1645)   Adequate oxygenation and airway patent. Complications related to anesthesia: None    Post-anesthesia assessment completed. No concerns. Patient has met all discharge requirements. Signed By: Ben Rivera MD    January 6, 2022                   INITIAL Post-op Vital signs:   Vitals Value Taken Time   /45 01/06/22 1645   Temp 37.2 °C (99 °F) 01/06/22 1423   Pulse 90 01/06/22 1648   Resp 11 01/06/22 1648   SpO2 95 % 01/06/22 1648   Vitals shown include unvalidated device data.

## 2022-01-06 NOTE — PROGRESS NOTES
Dr. Nereida Sy bedside, procedure discussed with patient and daughter, Vannesa Perez (daughter), signed consents with mother's permission.  Patient legally blind

## 2022-01-06 NOTE — PROCEDURES
[facility]  Pulmonary, Critical Care, and Sleep Medicine      Name: Zahra Baird MRN: [id number]   : 1945 Hospital: [facility] [place]   Date: 2022        Bronchoscopy with Endobronchial Ultrasound-Guided Transbronchial Needle Aspiration and Bronchio-alveolar Lavage (BAL)      Pre-procedure diagnosis  1. Right upper lobe cavitary lung mass  2. Mediastinal Adenopathy  3. Cigarette smoker    Post procedure diagnosis  Same  Differential diagnosis of lung cancer vs MAC infection. Procedures  1. EBUS Bronchoscopy  2. Diagnostic Bronchoscopy  3. EBUS guided TBNA of Lower Pretracheal lymph node station 4  4. BAL from Right U[pper lobe    Consent/Treatment: Informed consent was obtained from the  patient daughter Ivan Josue after risks, benefits and alternatives were explained. Timeout verified the correct patient and correct procedure. Anesthesia:   General anesthesia was performed by anesthesiology, 8.5 mm ETT was placed. Procedure Details: The EBUS bronchoscope was passed through the orotracheal tube using an airway adapter. The scope was passed into the mid trachea. Mediastinal inspection done. EBUS guided TBNA done in the following order using 21 G needle:    STATION SIZE TBNA                  TD*   Lower Pretracheal Lymph Node (station 4) < 1 cm Total of 5 passes and sent for cytology Inflammatory cells     TD* Rapid On Site Evaluation by Pathologist    Mediastinal inspection:  No subcarinal nodes, no LT hilar nodes, no RT hilar nodes (see photo)    DIAGNOSTIC BRONCHOSCOPU  Airways surveillance: The EBUS scope was removed and flexible bronchoscope was passed through the orotracheal tube using an airway adapter.   -- The mid and distal trachea was normal, main arpita was normal.   -- The right-sided endobronchial anatomy was completely inspected upto the sub-segmental levels, and were found to be normal.   -- The left-sided endobronchial anatomy was completely inspected upto the sub-segmental levels, and were found to be normal.   -- No bleeding or endobronchial masses or nodules seen. Minimal thin respiratory secretions from both sides of the airways suctioned. -- No blood clots seen; no active bleeding seen in the sites of TBNA. Specimens / Further Procedures:   Bronchio-Alveolar Lavage (BAL): The bronchoscope was wedged in the Right Upper lobe segments and bronchoalveolar lavage was performed with 20 cc aliquots times 6 for 2 separate tubes; material was sent for aerobic culture, AFB culture, Fungus culture, Viral culture, cytology. Transbronchial lung biopsies not done due to risks of pneumothorax and broncho-pleural fistula due to emphysematous lung disease in the upper lobes. Rapid On-Site Evaluation: A preliminary diagnosis of inflammatory cells. Complications: none  Balloon intact    Estimated Blood Loss: < 1 cc    PLAN  PACU  CXR stat  Await path/micro results  D/w family - daughter in detail, photos shown    Thank you for the opportunity to participate in the care of this patient.     Leroy Ray MD   CC - Dr Sonia Perez

## 2022-01-06 NOTE — PROGRESS NOTES
TRANSFER - IN REPORT:    Verbal report received from [address] [place] [address]. on Glowesley Horn  being received from PACU for routine post - op      Report consisted of patients Situation, Background, Assessment and   Recommendations(SBAR). Information from the following report(s) SBAR, Kardex, OR Summary, Intake/Output, MAR, and Recent Results was reviewed with the receiving nurse. Opportunity for questions and clarification was provided. Assessment will be completed upon patients arrival to unit and care assumed.

## 2022-01-06 NOTE — PERIOP NOTES
Patient changed to NC at 4 lpm, responds appropriately to verbal stimuli, denies pain or discomfort.

## 2022-01-06 NOTE — PERIOP NOTES
Patient more awake and coughing,  Oral airway removed and airway suctioned for small amount of blood tinged secretions.

## 2022-01-06 NOTE — ANESTHESIA PREPROCEDURE EVALUATION
Relevant Problems   No relevant active problems       Anesthetic History   No history of anesthetic complications            Review of Systems / Medical History  Patient summary reviewed, nursing notes reviewed and pertinent labs reviewed    Pulmonary    COPD: moderate      Shortness of breath and smoker         Neuro/Psych   Within defined limits          Comments: Legally blind Cardiovascular            Dysrhythmias       Exercise tolerance: <4 METS     GI/Hepatic/Renal  Within defined limits              Endo/Other        Arthritis     Other Findings              Physical Exam    Airway  Mallampati: II  TM Distance: 4 - 6 cm  Neck ROM: normal range of motion   Mouth opening: Normal     Cardiovascular               Dental    Dentition: Poor dentition     Pulmonary                 Abdominal  GI exam deferred       Other Findings            Anesthetic Plan    ASA: 3  Anesthesia type: general          Induction: Intravenous  Anesthetic plan and risks discussed with: Patient

## 2022-01-06 NOTE — PROGRESS NOTES
Daughter states patient is allergic to Sulfa drugs, [facility] [place] patient had sulfa and became SOB, allergy updated.

## 2022-01-07 ENCOUNTER — APPOINTMENT (OUTPATIENT)
Dept: GENERAL RADIOLOGY | Age: 77
End: 2022-01-07
Attending: INTERNAL MEDICINE
Payer: MEDICARE

## 2022-01-07 VITALS
TEMPERATURE: 98.4 F | OXYGEN SATURATION: 98 % | WEIGHT: 105.5 LBS | RESPIRATION RATE: 18 BRPM | SYSTOLIC BLOOD PRESSURE: 118 MMHG | HEART RATE: 79 BPM | BODY MASS INDEX: 21.27 KG/M2 | DIASTOLIC BLOOD PRESSURE: 46 MMHG | HEIGHT: 59 IN

## 2022-01-07 PROBLEM — R09.02 HYPOXIA: Status: ACTIVE | Noted: 2022-01-07

## 2022-01-07 LAB
ANION GAP SERPL CALC-SCNC: 5 MMOL/L (ref 3–18)
BASOPHILS # BLD: 0 K/UL (ref 0–0.1)
BASOPHILS NFR BLD: 0 % (ref 0–2)
BUN SERPL-MCNC: 24 MG/DL (ref 7–18)
BUN/CREAT SERPL: 30 (ref 12–20)
CALCIUM SERPL-MCNC: 8.8 MG/DL (ref 8.5–10.1)
CHLORIDE SERPL-SCNC: 112 MMOL/L (ref 100–111)
CO2 SERPL-SCNC: 26 MMOL/L (ref 21–32)
CREAT SERPL-MCNC: 0.8 MG/DL (ref 0.6–1.3)
DIFFERENTIAL METHOD BLD: ABNORMAL
EOSINOPHIL # BLD: 0 K/UL (ref 0–0.4)
EOSINOPHIL NFR BLD: 0 % (ref 0–5)
ERYTHROCYTE [DISTWIDTH] IN BLOOD BY AUTOMATED COUNT: 13.4 % (ref 11.6–14.5)
GLUCOSE SERPL-MCNC: 143 MG/DL (ref 74–99)
HCT VFR BLD AUTO: 35.1 % (ref 35–45)
HGB BLD-MCNC: 10.6 G/DL (ref 12–16)
IMM GRANULOCYTES # BLD AUTO: 0 K/UL (ref 0–0.04)
IMM GRANULOCYTES NFR BLD AUTO: 0 % (ref 0–0.5)
LYMPHOCYTES # BLD: 1 K/UL (ref 0.9–3.6)
LYMPHOCYTES NFR BLD: 11 % (ref 21–52)
MCH RBC QN AUTO: 32.3 PG (ref 24–34)
MCHC RBC AUTO-ENTMCNC: 30.2 G/DL (ref 31–37)
MCV RBC AUTO: 107 FL (ref 78–100)
MONOCYTES # BLD: 0.1 K/UL (ref 0.05–1.2)
MONOCYTES NFR BLD: 1 % (ref 3–10)
NEUTS SEG # BLD: 7.8 K/UL (ref 1.8–8)
NEUTS SEG NFR BLD: 87 % (ref 40–73)
NRBC # BLD: 0 K/UL (ref 0–0.01)
NRBC BLD-RTO: 0 PER 100 WBC
PLATELET # BLD AUTO: 211 K/UL (ref 135–420)
PMV BLD AUTO: 9.2 FL (ref 9.2–11.8)
POTASSIUM SERPL-SCNC: 4.5 MMOL/L (ref 3.5–5.5)
RBC # BLD AUTO: 3.28 M/UL (ref 4.2–5.3)
SODIUM SERPL-SCNC: 143 MMOL/L (ref 136–145)
WBC # BLD AUTO: 9 K/UL (ref 4.6–13.2)

## 2022-01-07 PROCEDURE — 80048 BASIC METABOLIC PNL TOTAL CA: CPT

## 2022-01-07 PROCEDURE — 94640 AIRWAY INHALATION TREATMENT: CPT

## 2022-01-07 PROCEDURE — 36415 COLL VENOUS BLD VENIPUNCTURE: CPT

## 2022-01-07 PROCEDURE — 74011250637 HC RX REV CODE- 250/637: Performed by: HOSPITALIST

## 2022-01-07 PROCEDURE — G0378 HOSPITAL OBSERVATION PER HR: HCPCS

## 2022-01-07 PROCEDURE — 85025 COMPLETE CBC W/AUTO DIFF WBC: CPT

## 2022-01-07 PROCEDURE — 77010033678 HC OXYGEN DAILY

## 2022-01-07 PROCEDURE — 74011000250 HC RX REV CODE- 250: Performed by: INTERNAL MEDICINE

## 2022-01-07 PROCEDURE — 74011250636 HC RX REV CODE- 250/636: Performed by: INTERNAL MEDICINE

## 2022-01-07 PROCEDURE — 96376 TX/PRO/DX INJ SAME DRUG ADON: CPT

## 2022-01-07 PROCEDURE — 71045 X-RAY EXAM CHEST 1 VIEW: CPT

## 2022-01-07 RX ORDER — PANTOPRAZOLE SODIUM 40 MG/1
40 TABLET, DELAYED RELEASE ORAL DAILY
Qty: 10 TABLET | Refills: 0 | Status: SHIPPED | OUTPATIENT
Start: 2022-01-07 | End: 2022-10-24

## 2022-01-07 RX ORDER — DOXYCYCLINE 100 MG/1
100 CAPSULE ORAL 2 TIMES DAILY
Qty: 10 CAPSULE | Refills: 0 | Status: SHIPPED | OUTPATIENT
Start: 2022-01-07 | End: 2022-10-24

## 2022-01-07 RX ORDER — PREDNISONE 5 MG/1
TABLET ORAL
Qty: 21 TABLET | Refills: 0 | Status: SHIPPED | OUTPATIENT
Start: 2022-01-07 | End: 2022-10-24

## 2022-01-07 RX ORDER — NICOTINE 21-14-7MG
1 KIT TRANSDERMAL DAILY
Qty: 56 PATCH | Refills: 0 | Status: SHIPPED | OUTPATIENT
Start: 2022-01-07 | End: 2022-10-24

## 2022-01-07 RX ORDER — ASPIRIN 81 MG/1
81 TABLET ORAL DAILY
Qty: 30 TABLET | Refills: 0 | Status: SHIPPED | OUTPATIENT
Start: 2022-01-07 | End: 2022-10-24

## 2022-01-07 RX ADMIN — IPRATROPIUM BROMIDE AND ALBUTEROL SULFATE 3 ML: .5; 3 SOLUTION RESPIRATORY (INHALATION) at 15:28

## 2022-01-07 RX ADMIN — MIDODRINE HYDROCHLORIDE 5 MG: 2.5 TABLET ORAL at 09:02

## 2022-01-07 RX ADMIN — METHYLPREDNISOLONE SODIUM SUCCINATE 40 MG: 40 INJECTION, POWDER, FOR SOLUTION INTRAMUSCULAR; INTRAVENOUS at 11:38

## 2022-01-07 RX ADMIN — IPRATROPIUM BROMIDE AND ALBUTEROL SULFATE 3 ML: .5; 3 SOLUTION RESPIRATORY (INHALATION) at 08:43

## 2022-01-07 RX ADMIN — MIDODRINE HYDROCHLORIDE 5 MG: 2.5 TABLET ORAL at 17:40

## 2022-01-07 RX ADMIN — IPRATROPIUM BROMIDE AND ALBUTEROL SULFATE 3 ML: .5; 3 SOLUTION RESPIRATORY (INHALATION) at 11:57

## 2022-01-07 RX ADMIN — METHYLPREDNISOLONE SODIUM SUCCINATE 40 MG: 40 INJECTION, POWDER, FOR SOLUTION INTRAMUSCULAR; INTRAVENOUS at 03:59

## 2022-01-07 RX ADMIN — ACETAMINOPHEN 650 MG: 325 TABLET ORAL at 11:38

## 2022-01-07 NOTE — ROUTINE PROCESS
Reviewed AVS discharge instructions with patient and patient daughter. All questions/concerns addressed. INT removed. VSS for discharge. Patient escorted by this RN to front door via wheelchair and 2L O2 and assisted into POV.

## 2022-01-07 NOTE — PROGRESS NOTES
Oxygen test:  Oxygen saturation at _____87________% on room air at rest.    Patient is unable to maintain O2 saturation more than 88 % without oxygen. Oxygen saturation rises to 93 % when 2L re-applied.     Thea Martin, MSN, RN, [facility]    Clinical Nurse Specialist    [address] [facility]  Office: [phone]   [address] [place] [address] Road: [phone]  [email or web address]

## 2022-01-07 NOTE — PROGRESS NOTES
Shift summary:  Patient slept through the night, denied pain or discomfort. Lungs with bilateral wheezing. Vital signs stable with O2 sats running from % on 2 liters O2. Patient Vitals for the past 8 hrs:   Temp Pulse Resp BP SpO2   01/07/22 0408 98.7 °F (37.1 °C) 85 15 (!) 118/59 97 %   01/06/22 2307 98.9 °F (37.2 °C) 85 14 (!) 101/48 100 %         8735 - 8793 - Bedside and Verbal shift change report given to EUGENE Tyson RN (oncoming nurse) by Doris Kenny (offgoing nurse). Report included the following information SBAR, Kardex, Intake/Output and MAR.

## 2022-01-07 NOTE — PROGRESS NOTES
1955  Bedside and verbal shift change report given to [facility] CHAYO by Verito Callahan RN. Report included SBAR, kardex, MAR, and recent results. Patient denying SOB. O2 sat 96% on 2 L. Patient has not yet voided post op. Rn aware.

## 2022-01-07 NOTE — PROGRESS NOTES
1900 Report received, assumed care of patient. 2000 Assessment completed and noted. Plan of care reviewed, pt verbalized understanding. Pt is drowsy but easily aroused. Alert and oriented x4. Incontinent of urine, linens changed. NAD, call bell within reach, will cont to monitor. 2200 Pt accepted scheduled medications as ordered (see MAR).     2330 Report given to Radha Zuñiga, [address] [facility]

## 2022-01-07 NOTE — PROGRESS NOTES
D/C plan: D/c home w/ home O2 pending 6 min walk test.     The pt will require a 6 min walk test to qualify for home O2 prior to d/c. S/p that test, the pt will need a portable tank delivered to her room and a concentrator delivered to her home. The order can't be sent over until after the completion of the 6min walk test. Bedside RNElbert is aware. Dr. Albania Kelly is aware. Reason for Admission:  Hypoxia requireing supplemental O2.                      RUR Score:        9%             Plan for utilizing home health:      No. Discussed HH w/ the pt and the pt has declined HH. Advised her to call Dr. Louie Jang office if she changes her mind. PCP: First and Last name:  Haroon Woo DO     Name of Practice:    Are you a current patient: Yes/No:    Approximate date of last visit:    Can you participate in a virtual visit with your PCP:                     Current Advanced Directive/Advance Care Plan: Full Code       Healthcare Decision Maker:   Click here to complete [address] including selection of the Healthcare Decision Maker Relationship (ie \"Primary\")           Pt's dtr: Vicki Joiner                     Transition of Care Plan:  Per H&P \"  Katrina Feng is a 68 y.o. female with lung mass, chronic hypotension, COPD smoker as a direct admit after bronc procedure. She received the procedure and tolerated bar well she presented to hypoxia postprocedure-2 L. Pulmonologist recommended overnight admission try to wean off oxygen. She lives alone, she took herself very well. She is legally blindness. She denies any shortness of breath no chest\"    Care Management Interventions  PCP Verified by CM: Yes (Dr. Romayne Shropshire. Last visit was in October. )  Mode of Transport at Discharge: Other (see comment) (The pt's dtr will transport her home. )  Transition of Care Consult (CM Consult):  Other (Home w/ O2. Pt's dtr; [facility] will transport her home. )  Discharge Durable Medical Equipment: Yes (O2 will be ordered through [address] pending 6min walk test.)  Physical Therapy Consult: No  Occupational Therapy Consult: No  Support Systems: Child(dominik) (dtr and niece. )  Confirm Follow Up Transport: Family  The Plan for Transition of Care is Related to the Following Treatment Goals : Home  The Patient and/or Patient Representative was Provided with a Choice of Provider and Agrees with the Discharge Plan?: Yes (Spoke to the pt at bedside and the pt's dtr via phone. )  Freedom of Choice List was Provided with Basic Dialogue that Supports the Patient's Individualized Plan of Care/Goals, Treatment Preferences and Shares the Quality Data Associated with the Providers?:  (n/a)  [employer] Information Provided?:  (n/a)  Discharge Location  Discharge Placement: Home

## 2022-01-07 NOTE — DISCHARGE SUMMARY
Discharge Summary    Patient: Jose De Jesus Kee MRN: [id number]  CSN: [id number]    YOB: 1945  Age: 68 y.o. Sex: female    DOA: 1/6/2022 LOS:  LOS: 1 day   Discharge Date:      Primary Care Provider:  Annette Fuller DO    Admission Diagnoses: COPD (chronic obstructive pulmonary disease) ([facility] [J44.9]  Hypoxemia requiring supplemental oxygen [R09.02, Z99.81]  Hypoxia [R09.02]    Discharge Diagnoses:    Hospital Problems  Never Reviewed          Codes Class Noted POA    Hypoxia ICD-10-CM: R09.02  ICD-9-CM: 799.02  1/7/2022 Unknown        COPD (chronic obstructive pulmonary disease) ([facility] ICD-10-CM: J44.9  ICD-9-CM: 928  1/6/2022 Unknown        * (Principal) Hypoxemia requiring supplemental oxygen ICD-10-CM: R09.02, Z99.81  ICD-9-CM: 799.02  1/6/2022 Unknown        Blindness ICD-10-CM: H54.7  ICD-9-CM: 369.00  1/6/2022 Unknown        Hypotension ICD-10-CM: I95.9  ICD-9-CM: 458.9  2020 Unknown        Smoker ICD-10-CM: F17.200  ICD-9-CM: 305.1  2020 Unknown        Lung mass ICD-10-CM: R91.8  ICD-9-CM: 786.6  2020 Unknown              Discharge Condition: stable     Discharge Medications:     Current Discharge Medication List      START taking these medications    Details   predniSONE (STERAPRED) 5 mg dose pack See administration instruction per 5mg dose pack  Qty: 21 Tablet, Refills: 0  Start date: 1/7/2022      doxycycline (MONODOX) 100 mg capsule Take 1 Capsule by mouth two (2) times a day. Qty: 10 Capsule, Refills: 0  Start date: 1/7/2022      pantoprazole (Protonix) 40 mg tablet Take 1 Tablet by mouth daily. Take medication while on prednisone. Qty: 10 Tablet, Refills: 0  Start date: 1/7/2022      Nicotine 21-14-7 mg/24 hr ptds 1 Patch by TransDERmal route daily. Qty: 56 Patch, Refills: 0  Start date: 1/7/2022         CONTINUE these medications which have CHANGED    Details   aspirin delayed-release 81 mg tablet Take 1 Tablet by mouth daily.  Hold medication while on po prednisone  Qty: 30 Tablet, Refills: 0  Start date: 1/7/2022         CONTINUE these medications which have NOT CHANGED    Details   fluticasone/umeclidin/vilanter (TRELEGY ELLIPTA IN) Take 1 Puff by inhalation daily. Indications: COPD      midodrine HCl (MIDODRINE PO) Take 5 mg by mouth three (3) times daily. Indications: hypotension             Procedures : bronch ,EBUS     Consults: Pulmonary/Critical Care      PHYSICAL EXAM   Visit Vitals  BP (!) 105/50   Pulse 71   Temp 98.2 °F (36.8 °C)   Resp 18   Ht 4' 11\" (1.499 m)   Wt 47.9 kg (105 lb 8 oz)   SpO2 93%   Breastfeeding No   BMI 21.31 kg/m²     General: Awake, cooperative, no acute distress    HEENT: NC, Atraumatic. PERRLA, EOMI. Anicteric sclerae. Lungs:  CTA Bilaterally. No Wheezing/Rhonchi/Rales. Heart:  Regular  rhythm,  No murmur, No Rubs, No Gallops  Abdomen: Soft, Non distended, Non tender. +Bowel sounds,   Extremities: No c/c/e  Psych:   Not anxious or agitated. Neurologic:  No acute neurological deficits. Admission HPI :     Mayela Eaton is a 68 y.o. female with lung mass, chronic hypotension, COPD smoker as a direct admit after [facility] procedure. She received the procedure and tolerated bar well she presented to hypoxia postprocedure-2 L. Pulmonologist recommended overnight admission try to wean off oxygen. She lives alone, she took herself very well. She is legally blindness. She denies any shortness of breath no chest pain  Hospital Course :   Mayela Eaton is a 68 y.o. female with lung mass, chronic hypotension, COPD smoker is admitted after procedure for one night observation. She received iv rocephine and azithromycin, and iv steroid during her shot stay. She was cleared to be d/c per Dr. Graves Portal. She will f/u with him for path results. Pt needs home with oxygen. Discussed with her daughter about provide 24 hr supervision after d/ home.  She is a smoker and discussed with her and daughter- it will be dangerous when she smokes while oxygen. Daughter reported that pt was r/c home with oxygen before and she weaned her self in 2-3 days. She did not smoke when she was on oxygen. Daughter will watch patient after discharge or will have somebody with pt while she is not at home. Her daughter is a RN. Discharge planning discussed with patient, pt agrees  with the plan and no questions and concerns at this point. Activity: Activity as tolerated    Diet: Regular Diet    Follow-up: PCP Dr. Benjie Guillen     Disposition: home     Minutes spent on discharge: 45 min       Labs: Results:       Chemistry Recent Labs     01/07/22 0522 01/06/22 1915   * 116*    142   K 4.5 4.6   * 112*   CO2 26 26   BUN 24* 20*   CREA 0.80 0.84   CA 8.8 9.0   AGAP 5 4   BUCR 30* 24*      CBC w/Diff Recent Labs     01/07/22 0522 01/06/22 1915   WBC 9.0 6.4   RBC 3.28* 3.40*   HGB 10.6* 11.0*   HCT 35.1 36.0    255   GRANS 87* 88*   LYMPH 11* 11*   EOS 0 0      Cardiac Enzymes No results for input(s): CPK, CKND1, PAULIE in the last 72 hours. No lab exists for component: CKRMB, TROIP   Coagulation No results for input(s): PTP, INR, APTT, INREXT in the last 72 hours. Lipid Panel No results found for: CHOL, CHOLPOCT, CHOLX, CHLST, CHOLV, 738186, HDL, HDLP, LDL, LDLC, DLDLP, 213841, VLDLC, VLDL, TGLX, TRIGL, TRIGP, TGLPOCT, CHHD, CHHDX   BNP No results for input(s): BNPP in the last 72 hours. Liver Enzymes No results for input(s): TP, ALB, TBIL, AP in the last 72 hours. No lab exists for component: SGOT, GPT, DBIL   Thyroid Studies No results found for: T4, T3U, TSH, TSHEXT           Significant Diagnostic Studies: XR CHEST PORT    Result Date: 1/7/2022  EXAM: XR CHEST PORT CLINICAL INDICATION/HISTORY: COPD -Additional: None COMPARISON: Radiographs 1/6/2022 TECHNIQUE: Portable frontal view of the chest _______________ FINDINGS: SUPPORT DEVICES: None.  HEART AND MEDIASTINUM: Stable appearing cardiac size and mediastinal contours. LUNGS AND PLEURAL SPACES: Volume loss in the right hemithorax with right apical opacity and right suprahilar/lower lung zone densities without appreciable change from prior. Coarsened interstitial markings bilaterally without change. No evidence of pneumothorax. Right-sided pleural effusion versus pleural thickening, also unchanged. BONY THORAX AND SOFT TISSUES: Unremarkable. _______________     1. No significant interval change. 2. Advanced changes of emphysema with asymmetric nodular opacities across the right upper and right lower lung zones essentially unchanged from prior radiographs. XR CHEST PORT    Result Date: 1/6/2022  EXAM: XR CHEST PORT CLINICAL INDICATION/HISTORY: S/p bronch and pretrach node aspirates; NO Lung biopsies done; RT UL BAL. -Additional: None COMPARISON: 12/20/2017 TECHNIQUE: Frontal view of the chest _______________ FINDINGS: HEART AND MEDIASTINUM: Normal cardiac size and mediastinal contours. LUNGS AND PLEURAL SPACES: Extensive bilateral parenchymal/interstitial opacities, right greater than left. No pneumothorax. BONY THORAX AND SOFT TISSUES: No acute osseous abnormality _______________     Extensive bilateral parenchymal/interstitial opacities, right greater than left. No pneumothorax.             Amy NewmanShriners Children's [facility] Cities     CC: Jame Mayo DO

## 2022-01-07 NOTE — PROGRESS NOTES
[facility]  Pulmonary, Critical Care, and Sleep Medicine    Name: Kristofer Falcon MRN: [id number]   : 1945 Hospital: [facility] [place]   Date: 2022  Room: [facility]     Subjective:     Patient is a 68 y.o. female known to me in my office. She has advanced COPD. She is being followed for left lower lobe lung nodule. Recently, she was referred to Dr. Carolyn Lawton, and patient underwent PET scan. This showed right upper lobe cavitary lung disease with metabolic activity, and right hilar changes. Patient is an active smoker. Patient is also blind in both eyes. Patient had bronchoscopy with EBUS yesterday. Postprocedure, she required oxygen supplementation, and hence admitted for observation. 2022  Patient seen and medical unit 312. No acute issues overnight. Patient is awake and alert. She is currently on room air. Breathing is normal-baseline. No cough or wheezing reported. No chest pain or palpitations. No hemoptysis.       Review of symptoms  -No fever or chills  -No vomiting or diarrhea  -As in HPI    Past Medical History:   Diagnosis Date    Arrhythmia     a-fib, history    Asthma     Cancer ([facility] 10/2021    right lung    Chronic obstructive pulmonary disease (HCC)     Hypotension     Ill-defined condition     PATIENT IS BLIND    Ill-defined condition     has a pessary    OA (osteoarthritis)       Past Surgical History:   Procedure Laterality Date    COLONOSCOPY N/A 3/19/2018    COLONOSCOPY  performed by Yancy Gupta MD at [address] HX HEENT      numerous eye surgeries    HX LAP CHOLECYSTECTOMY      HX TUBAL LIGATION Bilateral       Current Facility-Administered Medications   Medication Dose Route Frequency    sodium chloride (NS) flush 5-40 mL  5-40 mL IntraVENous Q8H    sodium chloride (NS) flush 5-40 mL  5-40 mL IntraVENous PRN    fentaNYL citrate (PF) injection 50 mcg  50 mcg IntraVENous PRN    naloxone (Jaky Daniels) injection 0.1 mg  0.1 mg IntraVENous PRN    flumazeniL (ROMAZICON) 0.1 mg/mL injection 0.2 mg  0.2 mg IntraVENous Multiple    albuterol-ipratropium (DUO-NEB) 2.5 MG-0.5 MG/3 ML  3 mL Nebulization QID RT    cefTRIAXone (ROCEPHIN) 1 g in 0.9% sodium chloride (MBP/ADV) 50 mL MBP  1 g IntraVENous Q24H    azithromycin (ZITHROMAX) 500 mg in 0.9% sodium chloride 250 mL (VIAL-MATE)  500 mg IntraVENous Q24H    methylPREDNISolone (PF) (SOLU-MEDROL) injection 40 mg  40 mg IntraVENous Q8H    sodium chloride (NS) flush 5-40 mL  5-40 mL IntraVENous Q8H    sodium chloride (NS) flush 5-40 mL  5-40 mL IntraVENous PRN    acetaminophen (TYLENOL) tablet 650 mg  650 mg Oral Q6H PRN    Or    acetaminophen (TYLENOL) suppository 650 mg  650 mg Rectal Q6H PRN    bisacodyL (DULCOLAX) suppository 10 mg  10 mg Rectal DAILY PRN    promethazine (PHENERGAN) tablet 12.5 mg  12.5 mg Oral Q6H PRN    Or    ondansetron (ZOFRAN) injection 4 mg  4 mg IntraVENous Q6H PRN    midodrine (PROAMATINE) tablet 5 mg  5 mg Oral TID     Prior to Admission medications    Medication Sig Start Date End Date Taking? Authorizing Provider   fluticasone/umeclidin/vilanter (TRELEGY ELLIPTA IN) Take 1 Puff by inhalation daily. Indications: COPD   Yes Provider, Historical   midodrine HCl (MIDODRINE PO) Take 5 mg by mouth three (3) times daily. Indications: hypotension   Yes Provider, Historical   aspirin delayed-release 81 mg tablet Take 81 mg by mouth daily. Provider, Historical     Allergies   Allergen Reactions    Sulfa (Sulfonamide Antibiotics) Shortness of Breath      Social History     Tobacco Use    Smoking status: Current Every Day Smoker     Packs/day: 0.75     Years: 58.00     Pack years: 43.50    Smokeless tobacco: Never Used    Tobacco comment: No smoking 24 hours prior to EBUS   Substance Use Topics    Alcohol use: No      History reviewed. No pertinent family history.        Objective:   Vital Signs:    Visit Vitals  BP (!) 105/51 (BP 1 Location: Right upper arm, BP Patient Position: At rest)   Pulse 81   Temp 98.4 °F (36.9 °C)   Resp 16   Ht 4' 11\" (1.499 m)   Wt 47.9 kg (105 lb 8 oz)   SpO2 100%   Breastfeeding No   BMI 21.31 kg/m²       O2 Device: None (Room air)   O2 Flow Rate (L/min): 2 l/min   Temp (24hrs), Av.6 °F (37 °C), Min:98 °F (36.7 °C), Max:99 °F (37.2 °C)       Intake/Output:     Intake/Output Summary (Last 24 hours) at 2022 1104  Last data filed at 2022 0631  Gross per 24 hour   Intake 1650 ml   Output 0 ml   Net 1650 ml         Physical Exam:   General: comfortable; on room air; appears old and frail  HEENT: Corneal opacities in both eyes; no epistaxis  Neck: No adenopathy or thyroid swelling  CVS: S1S2 no murmurs; JVD not elevated  RS: Mod AE bilaterally, no wheezing, scattered crackles, more in the right lung, normal respirations  Abd: soft, non tender, no hepatosplenomegaly  Neuro: non focal, awake, alert  Extrm: no leg edema   Skin: no rash  Psych: Pleasant mood    Data review:   Labs:    Recent Results (from the past 24 hour(s))   CULTURE, RESPIRATORY/SPUTUM/BRONCH W GRAM STAIN    Collection Time: 22  2:52 PM    Specimen: Bronchial lavage; Sputum   Result Value Ref Range    Special Requests: NO SPECIAL REQUESTS      GRAM STAIN RARE WBCS SEEN      GRAM STAIN RARE EPITHELIAL CELLS SEEN      GRAM STAIN NO ORGANISMS SEEN      Culture result: PENDING    CBC WITH AUTOMATED DIFF    Collection Time: 22  7:15 PM   Result Value Ref Range    WBC 6.4 4.6 - 13.2 K/uL    RBC 3.40 (L) 4.20 - 5.30 M/uL    HGB 11.0 (L) 12.0 - 16.0 g/dL    HCT 36.0 35.0 - 45.0 %    .9 (H) 78.0 - 100.0 FL    MCH 32.4 24.0 - 34.0 PG    MCHC 30.6 (L) 31.0 - 37.0 g/dL    RDW 13.8 11.6 - 14.5 %    PLATELET 699 626 - 799 K/uL    MPV 9.3 9.2 - 11.8 FL    NRBC 0.0 0  WBC    ABSOLUTE NRBC 0.00 0.00 - 0.01 K/uL    NEUTROPHILS 88 (H) 40 - 73 %    LYMPHOCYTES 11 (L) 21 - 52 %    MONOCYTES 1 (L) 3 - 10 %    EOSINOPHILS 0 0 - 5 % BASOPHILS 0 0 - 2 %    IMMATURE GRANULOCYTES 0 0.0 - 0.5 %    ABS. NEUTROPHILS 5.6 1.8 - 8.0 K/UL    ABS. LYMPHOCYTES 0.7 (L) 0.9 - 3.6 K/UL    ABS. MONOCYTES 0.0 (L) 0.05 - 1.2 K/UL    ABS. EOSINOPHILS 0.0 0.0 - 0.4 K/UL    ABS. BASOPHILS 0.0 0.0 - 0.1 K/UL    ABS. IMM. GRANS. 0.0 0.00 - 0.04 K/UL    DF AUTOMATED     METABOLIC PANEL, BASIC    Collection Time: 01/06/22  7:15 PM   Result Value Ref Range    Sodium 142 136 - 145 mmol/L    Potassium 4.6 3.5 - 5.5 mmol/L    Chloride 112 (H) 100 - 111 mmol/L    CO2 26 21 - 32 mmol/L    Anion gap 4 3.0 - 18 mmol/L    Glucose 116 (H) 74 - 99 mg/dL    BUN 20 (H) 7.0 - 18 MG/DL    Creatinine 0.84 0.6 - 1.3 MG/DL    BUN/Creatinine ratio 24 (H) 12 - 20      GFR est AA >60 >60 ml/min/1.73m2    GFR est non-AA >60 >60 ml/min/1.73m2    Calcium 9.0 8.5 - 10.1 MG/DL   TROPONIN-HIGH SENSITIVITY    Collection Time: 01/06/22  7:15 PM   Result Value Ref Range    Troponin-High Sensitivity 10 0 - 54 ng/L   CBC WITH AUTOMATED DIFF    Collection Time: 01/07/22  5:22 AM   Result Value Ref Range    WBC 9.0 4.6 - 13.2 K/uL    RBC 3.28 (L) 4.20 - 5.30 M/uL    HGB 10.6 (L) 12.0 - 16.0 g/dL    HCT 35.1 35.0 - 45.0 %    .0 (H) 78.0 - 100.0 FL    MCH 32.3 24.0 - 34.0 PG    MCHC 30.2 (L) 31.0 - 37.0 g/dL    RDW 13.4 11.6 - 14.5 %    PLATELET 284 883 - 534 K/uL    MPV 9.2 9.2 - 11.8 FL    NRBC 0.0 0  WBC    ABSOLUTE NRBC 0.00 0.00 - 0.01 K/uL    NEUTROPHILS 87 (H) 40 - 73 %    LYMPHOCYTES 11 (L) 21 - 52 %    MONOCYTES 1 (L) 3 - 10 %    EOSINOPHILS 0 0 - 5 %    BASOPHILS 0 0 - 2 %    IMMATURE GRANULOCYTES 0 0.0 - 0.5 %    ABS. NEUTROPHILS 7.8 1.8 - 8.0 K/UL    ABS. LYMPHOCYTES 1.0 0.9 - 3.6 K/UL    ABS. MONOCYTES 0.1 0.05 - 1.2 K/UL    ABS. EOSINOPHILS 0.0 0.0 - 0.4 K/UL    ABS. BASOPHILS 0.0 0.0 - 0.1 K/UL    ABS. IMM.  GRANS. 0.0 0.00 - 0.04 K/UL    DF AUTOMATED     METABOLIC PANEL, BASIC    Collection Time: 01/07/22  5:22 AM   Result Value Ref Range    Sodium 143 136 - 145 mmol/L Potassium 4.5 3.5 - 5.5 mmol/L    Chloride 112 (H) 100 - 111 mmol/L    CO2 26 21 - 32 mmol/L    Anion gap 5 3.0 - 18 mmol/L    Glucose 143 (H) 74 - 99 mg/dL    BUN 24 (H) 7.0 - 18 MG/DL    Creatinine 0.80 0.6 - 1.3 MG/DL    BUN/Creatinine ratio 30 (H) 12 - 20      GFR est AA >60 >60 ml/min/1.73m2    GFR est non-AA >60 >60 ml/min/1.73m2    Calcium 8.8 8.5 - 10.1 MG/DL       ABG:  No results found for: PH, PHI, PCO2, PCO2I, PO2, PO2I, HCO3, HCO3I, FIO2, FIO2I    Imaging:  I have personally reviewed the patients radiographs and have reviewed the reports:  Chest x-ray 1/7/1022  CLINICAL INDICATION/HISTORY: COPD  -Additional: None  COMPARISON: Radiographs 1/6/2022  TECHNIQUE: Portable frontal view of the chest  FINDINGS:  SUPPORT DEVICES: None. HEART AND MEDIASTINUM: Stable appearing cardiac size and mediastinal contours. LUNGS AND PLEURAL SPACES: Volume loss in the right hemithorax with right apical  opacity and right suprahilar/lower lung zone densities without appreciable  change from prior. Coarsened interstitial markings bilaterally without change. No evidence of pneumothorax. Right-sided pleural effusion versus pleural  thickening, also unchanged. BONY THORAX AND SOFT TISSUES: Unremarkable. IMPRESSION  1. No significant interval change. 2. Advanced changes of emphysema with asymmetric nodular opacities across the  right upper and right lower lung zones essentially unchanged from prior  Radiographs. IMPRESSION:   · Emphysema  · Cavitary Lung disease  · Lung Nodule  · Smoker      RECOMMENDATIONS:   · S/p bronchoscopy with EBUS-follow microbiology and pathology results. · Stable respirations; currently on room air. · Chest x-ray shows advanced emphysematous lung disease, with fibrosis changes-especially on the right side of the lung; no pneumothorax or pleural effusions. · Discussed with patient about importance of smoking cessation.   · Discussed that she likely will need home oxygen therapy; patient does not want oxygen therapy; there is fire risks because patient is blind, lives alone, and smokes actively inside the house. · Continue COPD management with bronchodilators-Trelegy 100 mcg 1 puff daily, and albuterol inhaler. · Labs reviewed. · Smoking cessation emphasized. · Follow-up with me in clinic in 2 weeks. Appreciate hospitalist Dr. Juancarlos Ren assistance. Patient can be discharged from pulmonary perspective. Discussed with patient in detail and updated management plans. I have called and discussed with patient's daughter Graham Rose, and updated; discussed about need for smoking cessation for possible oxygen supplemental therapy in the near future, and to avoid fire hazard risks; patient's daughter monitors oxygen saturations at home with pulse oximeter.          Amparo White MD

## 2022-01-07 NOTE — PROGRESS NOTES
Problem: Falls - Risk of  Goal: *Absence of Falls  Description: Document Marciana Distance Fall Risk and appropriate interventions in the flowsheet.   Outcome: Progressing Towards Goal  Note: Fall Risk Interventions:  Mobility Interventions: Utilize walker, cane, or other assistive device         Medication Interventions: Patient to call before getting OOB,Teach patient to arise slowly    Elimination Interventions: Call light in reach,Toileting schedule/hourly rounds              Problem: Patient Education: Go to Patient Education Activity  Goal: Patient/Family Education  Outcome: Progressing Towards Goal     Problem: General Medical Care Plan  Goal: *Vital signs within specified parameters  Outcome: Progressing Towards Goal  Goal: *Labs within defined limits  Outcome: Progressing Towards Goal  Goal: *Absence of infection signs and symptoms  Outcome: Progressing Towards Goal  Goal: *Optimal pain control at patient's stated goal  Outcome: Progressing Towards Goal  Goal: *Skin integrity maintained  Outcome: Progressing Towards Goal  Goal: *Fluid volume balance  Outcome: Progressing Towards Goal  Goal: *Optimize nutritional status  Outcome: Progressing Towards Goal  Goal: *Anxiety reduced or absent  Outcome: Progressing Towards Goal  Goal: *Progressive mobility and function (eg: ADL's)  Outcome: Progressing Towards Goal     Problem: Patient Education: Go to Patient Education Activity  Goal: Patient/Family Education  Outcome: Progressing Towards Goal

## 2022-01-07 NOTE — PROGRESS NOTES
Physician Progress Note      [facility]  CSN #:                  [id number]  :                       1945  ADMIT DATE:       2022 10:54 AM  DISCH DATE:  RESPONDING  PROVIDER #:        Kerwin HEAD MD          QUERY TEXT:    Patient admitted with  hypoxia, noted to have  Copd. If possible, please document in progress notes and discharge summary if you are evaluating and /or treating any of the following: The medical record reflects the following:  Risk Factors:copd  Clinical Indicators:  Copd  with breathing treatment, lung mass    Treatment: nuo-neb, antibiotic ,solu-Medrol    Thank You  Varinder Dotson RN [facility]  [phone]  Options provided:  -- Unspecified COPD exacerbation  -- Emphysema  -- No COPD exacerbation  -- Other - I will add my own diagnosis  -- Disagree - Not applicable / Not valid  -- Disagree - Clinically unable to determine / Unknown  -- Refer to Clinical Documentation Reviewer    PROVIDER RESPONSE TEXT:    No COPD exacerbation    Query created by: Waldo Boss on 2022 9:01 AM      QUERY TEXT:    Pt admitted with S/P Bronchoscopy with Endobronchial Ultrasound-Guided Transbronchial Needle Aspiration and Bronchio-alveolar Lavage (BAL) . Pt noted to have hypoxia  postprocedure. If possible, please document in progress notes and discharge summary:      The medical record reflects the following:  Risk Factors: lung mass  Clinical Indicators: Documented by Hospitalist   H&P \" Oxygen supplement-wean as tolerated-She received the procedure and tolerated bar well she presented to hypoxia postprocedure-2 L. Pulmonologist recommended overnight admission try to wean off oxygen\" .   O2 Sats 91%  Treatment:  2L/min    Thank You  Varinder Dotson RN [facility]  [phone]  Options provided:  -- Hypoxia  is a postoperative complication  -- Hypoxia is an expected/inherent condition that occurred postoperatively and not a complication  -- Hypoxia is not a postoperative complication, but is due to other incidental risk factor, Please specify other incidental risk factor  -- Other - I will add my own diagnosis  -- Disagree - Not applicable / Not valid  -- Disagree - Clinically unable to determine / Unknown  -- Refer to Clinical Documentation Reviewer    PROVIDER RESPONSE TEXT:    Patient has Hypoxia as an expected condition that occurred postoperatively and not a complication.     Query created by: Ivette Ramires on 1/7/2022 10:06 AM      Electronically signed by:  Chas Gibson MD 1/7/2022 6:37 PM

## 2022-01-09 LAB
BACTERIA SPEC CULT: ABNORMAL
BACTERIA SPEC CULT: ABNORMAL
GRAM STN SPEC: ABNORMAL
SERVICE CMNT-IMP: ABNORMAL

## 2022-01-14 LAB
SPECIMEN SOURCE: NORMAL
VIRUS SPEC CULT: NORMAL

## 2022-02-07 LAB
BACTERIA SPEC CULT: NORMAL
SERVICE CMNT-IMP: NORMAL

## 2022-02-19 LAB
ACID FAST STN SPEC: NEGATIVE
MYCOBACTERIUM SPEC QL CULT: NEGATIVE
SPECIMEN PREPARATION: NORMAL
SPECIMEN SOURCE: NORMAL

## 2022-10-10 ENCOUNTER — HOSPITAL ENCOUNTER (EMERGENCY)
Age: 77
Discharge: HOME HEALTH | End: 2022-10-10
Attending: EMERGENCY MEDICINE
Payer: MEDICARE

## 2022-10-10 ENCOUNTER — APPOINTMENT (OUTPATIENT)
Dept: GENERAL RADIOLOGY | Age: 77
End: 2022-10-10
Attending: EMERGENCY MEDICINE
Payer: MEDICARE

## 2022-10-10 VITALS
RESPIRATION RATE: 26 BRPM | BODY MASS INDEX: 22.3 KG/M2 | HEART RATE: 86 BPM | WEIGHT: 113.6 LBS | HEIGHT: 60 IN | DIASTOLIC BLOOD PRESSURE: 66 MMHG | OXYGEN SATURATION: 94 % | TEMPERATURE: 98.3 F | SYSTOLIC BLOOD PRESSURE: 97 MMHG

## 2022-10-10 DIAGNOSIS — J44.1 ACUTE EXACERBATION OF CHRONIC OBSTRUCTIVE PULMONARY DISEASE (COPD) (HCC): Primary | ICD-10-CM

## 2022-10-10 LAB
ALBUMIN SERPL-MCNC: 1.6 G/DL (ref 3.4–5)
ALBUMIN/GLOB SERPL: 0.4 {RATIO} (ref 0.8–1.7)
ALP SERPL-CCNC: 130 U/L (ref 45–117)
ALT SERPL-CCNC: 32 U/L (ref 13–56)
ANION GAP SERPL CALC-SCNC: 5 MMOL/L (ref 3–18)
AST SERPL-CCNC: 28 U/L (ref 10–38)
BASOPHILS # BLD: 0 K/UL (ref 0–0.1)
BASOPHILS NFR BLD: 0 % (ref 0–2)
BILIRUB SERPL-MCNC: 0.9 MG/DL (ref 0.2–1)
BNP SERPL-MCNC: 243 PG/ML (ref 0–1800)
BUN SERPL-MCNC: 13 MG/DL (ref 7–18)
BUN/CREAT SERPL: 19 (ref 12–20)
CALCIUM SERPL-MCNC: 8.4 MG/DL (ref 8.5–10.1)
CHLORIDE SERPL-SCNC: 106 MMOL/L (ref 100–111)
CO2 SERPL-SCNC: 31 MMOL/L (ref 21–32)
CREAT SERPL-MCNC: 0.67 MG/DL (ref 0.6–1.3)
DIFFERENTIAL METHOD BLD: ABNORMAL
EOSINOPHIL # BLD: 0.1 K/UL (ref 0–0.4)
EOSINOPHIL NFR BLD: 1 % (ref 0–5)
ERYTHROCYTE [DISTWIDTH] IN BLOOD BY AUTOMATED COUNT: 15.4 % (ref 11.6–14.5)
GLOBULIN SER CALC-MCNC: 4.4 G/DL (ref 2–4)
GLUCOSE SERPL-MCNC: 85 MG/DL (ref 74–99)
HCT VFR BLD AUTO: 34.3 % (ref 35–45)
HGB BLD-MCNC: 11.1 G/DL (ref 12–16)
IMM GRANULOCYTES # BLD AUTO: 0 K/UL (ref 0–0.04)
IMM GRANULOCYTES NFR BLD AUTO: 0 % (ref 0–0.5)
LYMPHOCYTES # BLD: 0.8 K/UL (ref 0.9–3.6)
LYMPHOCYTES NFR BLD: 9 % (ref 21–52)
MCH RBC QN AUTO: 34 PG (ref 24–34)
MCHC RBC AUTO-ENTMCNC: 32.4 G/DL (ref 31–37)
MCV RBC AUTO: 105.2 FL (ref 78–100)
MONOCYTES # BLD: 0.6 K/UL (ref 0.05–1.2)
MONOCYTES NFR BLD: 6 % (ref 3–10)
NEUTS SEG # BLD: 7.7 K/UL (ref 1.8–8)
NEUTS SEG NFR BLD: 83 % (ref 40–73)
NRBC # BLD: 0 K/UL (ref 0–0.01)
NRBC BLD-RTO: 0 PER 100 WBC
PLATELET # BLD AUTO: 98 K/UL (ref 135–420)
PMV BLD AUTO: 10.8 FL (ref 9.2–11.8)
POTASSIUM SERPL-SCNC: 3.2 MMOL/L (ref 3.5–5.5)
PROT SERPL-MCNC: 6 G/DL (ref 6.4–8.2)
RBC # BLD AUTO: 3.26 M/UL (ref 4.2–5.3)
SODIUM SERPL-SCNC: 142 MMOL/L (ref 136–145)
TROPONIN-HIGH SENSITIVITY: 10 NG/L (ref 0–54)
WBC # BLD AUTO: 9.2 K/UL (ref 4.6–13.2)

## 2022-10-10 PROCEDURE — 80053 COMPREHEN METABOLIC PANEL: CPT

## 2022-10-10 PROCEDURE — 83880 ASSAY OF NATRIURETIC PEPTIDE: CPT

## 2022-10-10 PROCEDURE — 99285 EMERGENCY DEPT VISIT HI MDM: CPT

## 2022-10-10 PROCEDURE — 93005 ELECTROCARDIOGRAM TRACING: CPT

## 2022-10-10 PROCEDURE — 85025 COMPLETE CBC W/AUTO DIFF WBC: CPT

## 2022-10-10 PROCEDURE — 74011000250 HC RX REV CODE- 250: Performed by: EMERGENCY MEDICINE

## 2022-10-10 PROCEDURE — 84484 ASSAY OF TROPONIN QUANT: CPT

## 2022-10-10 PROCEDURE — 71045 X-RAY EXAM CHEST 1 VIEW: CPT

## 2022-10-10 RX ORDER — IPRATROPIUM BROMIDE AND ALBUTEROL SULFATE 2.5; .5 MG/3ML; MG/3ML
3 SOLUTION RESPIRATORY (INHALATION)
Status: COMPLETED | OUTPATIENT
Start: 2022-10-10 | End: 2022-10-10

## 2022-10-10 RX ORDER — DOXYCYCLINE HYCLATE 100 MG
100 TABLET ORAL 2 TIMES DAILY
Qty: 10 TABLET | Refills: 0 | Status: SHIPPED | OUTPATIENT
Start: 2022-10-10 | End: 2022-10-15

## 2022-10-10 RX ORDER — PREDNISONE 50 MG/1
50 TABLET ORAL DAILY
Qty: 4 TABLET | Refills: 0 | Status: SHIPPED | OUTPATIENT
Start: 2022-10-10 | End: 2022-10-14

## 2022-10-10 RX ADMIN — IPRATROPIUM BROMIDE AND ALBUTEROL SULFATE 3 ML: .5; 3 SOLUTION RESPIRATORY (INHALATION) at 18:01

## 2022-10-10 NOTE — ED TRIAGE NOTES
Pt arrive to ed via ems with c/o SOB. Per report from ems, pt is coming from [address], SOB started this AM. HX of COPD. Normally on 2 L NC. SpO2 94% on 4 L in field. Pt legally blind in both eyes . Pt is a fall risk, two suture noted to upper lip due to previous fall. Pt is A& O X 4. Tested positive for COVID 19 10/5/2022. NADA at this time, pt speaks in full sentences.

## 2022-10-10 NOTE — ED NOTES
Bedside and Verbal shift change report given to [address] [place] [address] Street (oncoming nurse) by Katrina Brown RN (offgoing nurse). Report included the following information SBAR.

## 2022-10-10 NOTE — ED PROVIDER NOTES
EMERGENCY DEPARTMENT HISTORY AND PHYSICAL EXAM      Date: 10/10/2022  Patient Name: Ramone Olvera    History of Presenting Illness     Chief Complaint   Patient presents with    Shortness of Breath    Cough       History Provided By: Patient    HPI: Ramone Olvera, 68 y.o. female presents to the ED with cc of shortness of breath. 26-year-old female with a history of COPD, chronic respiratory failure on 2L O2, atrial fibrillation and legally blind who presents to the emergency department from a skilled nursing facility with a chief complaint of cough. Patient reports symptoms began 1 week ago. Patient reports nonproductive cough with increasing shortness of breath. Patient reports the symptoms are mild to moderate without alleviating or aggravating factors. She reports the cough is not productive of sputum. Patient denies any associated fever or chills. Denies chest pain. Denies abdominal pain, nausea, vomiting or diarrhea. Denies any leg swelling. There are no other complaints, changes, or physical findings at this time. PCP: Shannan Edmondson MD    No current facility-administered medications on file prior to encounter. Current Outpatient Medications on File Prior to Encounter   Medication Sig Dispense Refill    predniSONE (STERAPRED) 5 mg dose pack See administration instruction per 5mg dose pack 21 Tablet 0    doxycycline (MONODOX) 100 mg capsule Take 1 Capsule by mouth two (2) times a day. 10 Capsule 0    pantoprazole (Protonix) 40 mg tablet Take 1 Tablet by mouth daily. Take medication while on prednisone. 10 Tablet 0    aspirin delayed-release 81 mg tablet Take 1 Tablet by mouth daily. Hold medication while on po prednisone 30 Tablet 0    Nicotine 21-14-7 mg/24 hr ptds 1 Patch by TransDERmal route daily. 56 Patch 0    fluticasone/umeclidin/vilanter (TRELEGY ELLIPTA IN) Take 1 Puff by inhalation daily.  Indications: COPD      midodrine HCl (MIDODRINE PO) Take 5 mg by mouth three (3) times daily. Indications: hypotension         Past History     Past Medical History:  Past Medical History:   Diagnosis Date    Arrhythmia     a-fib, history    Asthma     Cancer ([facility] [address] 10/2021    right lung    Chronic obstructive pulmonary disease (HCC)     Hypotension 2020    Ill-defined condition 1988    PATIENT IS BLIND    Ill-defined condition     has a pessary    OA (osteoarthritis)        Past Surgical History:  Past Surgical History:   Procedure Laterality Date    COLONOSCOPY N/A 3/19/2018    COLONOSCOPY  performed by Tiago Becker MD at THE [facility] ENDOSCOPY    HX HEENT      numerous eye surgeries    HX LAP CHOLECYSTECTOMY      HX TUBAL LIGATION Bilateral        Family History:  No family history on file. Social History:  Social History     Tobacco Use    Smoking status: Every Day     Packs/day: 0.75     Years: 58.00     Pack years: 43.50     Types: Cigarettes    Smokeless tobacco: Never    Tobacco comments:     No smoking 24 hours prior to EBUS   Substance Use Topics    Alcohol use: No    Drug use: No       Allergies: Allergies   Allergen Reactions    Sulfa (Sulfonamide Antibiotics) Shortness of Breath         Review of Systems   Review of Systems   Constitutional:  Negative for activity change, chills and fever. HENT:  Negative for facial swelling and voice change. Eyes:  Negative for redness. Respiratory:  Positive for cough and shortness of breath. Negative for wheezing. Cardiovascular:  Negative for chest pain and leg swelling. Gastrointestinal:  Negative for abdominal pain, diarrhea, nausea and vomiting. Genitourinary:  Negative for decreased urine volume. Musculoskeletal:  Negative for gait problem. Skin:  Negative for pallor and rash. Neurological:  Negative for tremors and facial asymmetry. Psychiatric/Behavioral:  Negative for agitation. All other systems reviewed and are negative. Physical Exam   Physical Exam  Vitals and nursing note reviewed.    Constitutional: Comments: 49-year-old female, resting in bed, appears chronically ill but not acutely in distress   HENT:      Head: Normocephalic and atraumatic. Cardiovascular:      Rate and Rhythm: Normal rate and regular rhythm. Heart sounds: No murmur heard. No friction rub. No gallop. Pulmonary:      Effort: Pulmonary effort is normal.      Breath sounds: Examination of the right-lower field reveals wheezing. Examination of the left-lower field reveals wheezing. Wheezing present. Abdominal:      Palpations: Abdomen is soft. Tenderness: There is no abdominal tenderness. Musculoskeletal:         General: Normal range of motion. Cervical back: Normal range of motion. Right lower leg: No edema. Left lower leg: No edema. Skin:     General: Skin is warm. Capillary Refill: Capillary refill takes less than 2 seconds. Neurological:      General: No focal deficit present. Mental Status: She is alert.    Psychiatric:         Mood and Affect: Mood normal.       Diagnostic Study Results     Labs -     Recent Results (from the past 12 hour(s))   EKG, 12 LEAD, INITIAL    Collection Time: 10/10/22  5:35 PM   Result Value Ref Range    Ventricular Rate 89 BPM    Atrial Rate 89 BPM    P-R Interval 128 ms    QRS Duration 76 ms    Q-T Interval 362 ms    QTC Calculation (Bezet) 440 ms    Calculated P Axis 75 degrees    Calculated R Axis 69 degrees    Calculated T Axis 87 degrees    Diagnosis       Normal sinus rhythm with sinus arrhythmia  Minimal voltage criteria for LVH, may be normal variant ( Sokolow-Shah )  Borderline ECG  When compared with ECG of 30-DEC-2021 09:16,  No significant change was found     CBC WITH AUTOMATED DIFF    Collection Time: 10/10/22  5:45 PM   Result Value Ref Range    WBC 9.2 4.6 - 13.2 K/uL    RBC 3.26 (L) 4.20 - 5.30 M/uL    HGB 11.1 (L) 12.0 - 16.0 g/dL    HCT 34.3 (L) 35.0 - 45.0 %    .2 (H) 78.0 - 100.0 FL    MCH 34.0 24.0 - 34.0 PG    MCHC 32.4 31.0 - 37.0 g/dL    RDW 15.4 (H) 11.6 - 14.5 %    PLATELET 98 (L) 924 - 420 K/uL    MPV 10.8 9.2 - 11.8 FL    NRBC 0.0 0  WBC    ABSOLUTE NRBC 0.00 0.00 - 0.01 K/uL    NEUTROPHILS 83 (H) 40 - 73 %    LYMPHOCYTES 9 (L) 21 - 52 %    MONOCYTES 6 3 - 10 %    EOSINOPHILS 1 0 - 5 %    BASOPHILS 0 0 - 2 %    IMMATURE GRANULOCYTES 0 0.0 - 0.5 %    ABS. NEUTROPHILS 7.7 1.8 - 8.0 K/UL    ABS. LYMPHOCYTES 0.8 (L) 0.9 - 3.6 K/UL    ABS. MONOCYTES 0.6 0.05 - 1.2 K/UL    ABS. EOSINOPHILS 0.1 0.0 - 0.4 K/UL    ABS. BASOPHILS 0.0 0.0 - 0.1 K/UL    ABS. IMM. GRANS. 0.0 0.00 - 0.04 K/UL    DF AUTOMATED     METABOLIC PANEL, COMPREHENSIVE    Collection Time: 10/10/22  5:45 PM   Result Value Ref Range    Sodium 142 136 - 145 mmol/L    Potassium 3.2 (L) 3.5 - 5.5 mmol/L    Chloride 106 100 - 111 mmol/L    CO2 31 21 - 32 mmol/L    Anion gap 5 3.0 - 18 mmol/L    Glucose 85 74 - 99 mg/dL    BUN 13 7.0 - 18 MG/DL    Creatinine 0.67 0.6 - 1.3 MG/DL    BUN/Creatinine ratio 19 12 - 20      eGFR >60 >60 ml/min/1.73m2    Calcium 8.4 (L) 8.5 - 10.1 MG/DL    Bilirubin, total 0.9 0.2 - 1.0 MG/DL    ALT (SGPT) 32 13 - 56 U/L    AST (SGOT) 28 10 - 38 U/L    Alk.  phosphatase 130 (H) 45 - 117 U/L    Protein, total 6.0 (L) 6.4 - 8.2 g/dL    Albumin 1.6 (L) 3.4 - 5.0 g/dL    Globulin 4.4 (H) 2.0 - 4.0 g/dL    A-G Ratio 0.4 (L) 0.8 - 1.7     TROPONIN-HIGH SENSITIVITY    Collection Time: 10/10/22  5:45 PM   Result Value Ref Range    Troponin-High Sensitivity 10 0 - 54 ng/L   NT-PRO BNP    Collection Time: 10/10/22  5:45 PM   Result Value Ref Range    NT pro- 0 - 1,800 PG/ML       Radiologic Studies -   XR CHEST PORT   Final Result      No significant interval change compared to the prior exam demonstrating   emphysema and right lung volume loss with areas of scarring and consolidation   which are similar to the prior exam.        CT Results  (Last 48 hours)      None          CXR Results  (Last 48 hours)                 10/10/22 7796  XR CHEST PORT Final result    Impression:      No significant interval change compared to the prior exam demonstrating   emphysema and right lung volume loss with areas of scarring and consolidation   which are similar to the prior exam.       Narrative:  EXAM: CHEST RADIOGRAPH       CLINICAL INDICATION/HISTORY: cough, shortness of breath     > Additional: None       COMPARISON: Chest radiograph 1/7/2022, chest CT 12/20/2017       TECHNIQUE: Portable frontal view of the chest       _______________       FINDINGS:       SUPPORT DEVICES: None. HEART AND MEDIASTINUM: Heart size is normal.       LUNGS AND PLEURAL SPACES: Emphysema with right lung volume loss. Right apical   and right basilar consolidation with architectural distortion again identified,   similar to the prior exam. Coarse interstitial markings throughout the left lung   with no new focal consolidation. No pneumothorax. BONES AND SOFT TISSUES: Unremarkable.       _______________                   Medical Decision Making   I am the first provider for this patient. I reviewed the vital signs, available nursing notes, past medical history, past surgical history, family history and social history. Vital Signs-Reviewed the patient's vital signs. Patient Vitals for the past 12 hrs:   Temp Pulse Resp BP SpO2   10/10/22 1832 -- 86 26 -- 94 %   10/10/22 1817 -- 87 29 -- 94 %   10/10/22 1802 -- 82 (!) 35 -- 94 %   10/10/22 1747 -- 93 25 -- 95 %   10/10/22 1741 -- -- -- -- 94 %   10/10/22 1732 98.3 °F (36.8 °C) 92 17 97/66 94 %     Records Reviewed: Nursing Notes and Old Medical Records    Provider Notes (Medical Decision Making):     70-year-old female presents emergency department with chief plaint of shortness of breath and cough from skilled nursing facility. Patient appears well, but chronically ill. She is on 2 L, I will discussed with nursing if this is her baseline.   She does have a mildly low blood pressure, although on her face sheet from SNF has a history of \"hypotension. \"  I do not believe that she is septic as she has no fever or other SIRS criteria. We will check basic labs with troponin and BNP. Check chest x-ray. Will give DuoNeb. Likely COPD exacerbation, other considerations include less likely pneumonia. Doubt CHF exacerbation. Additionally doubt PE.    ED Course:   Initial assessment performed. The patients presenting problems have been discussed, and they are in agreement with the care plan formulated and outlined with them. I have encouraged them to ask questions as they arise throughout their visit. ED Course as of 10/10/22 [address] Oct 10, 2022   1749 Preliminary EKG interpreted by me. Shows normal sinus rhythm with a HR of 89. No ST elevations or depressions concerning for ischemia. Normal intervals. [MB]   2002 CBC and CMP are unremarkable. Troponin is patient's baseline. Chest x-ray reviewed and unchanged. [MB]   2006 Reassessed, sleeping in bed. No respiratory distress noted. Repeat auscultation reveals improved wheezing. She is saturating 96% on 2 L. We will treat with steroids and antibiotics given history of COPD. Discharged to rehab with return precautions. [MB]   2157 Repeat BP unremarkable. SBP >100. [MB]      ED Course User Index  [MB] MD David Velazquez MD      Disposition:    Discharged    DISCHARGE PLAN:  1. Current Discharge Medication List        START taking these medications    Details   predniSONE (DELTASONE) 50 mg tablet Take 1 Tablet by mouth daily for 4 days. Qty: 4 Tablet, Refills: 0  Start date: 10/10/2022, End date: 10/14/2022      doxycycline (VIBRA-TABS) 100 mg tablet Take 1 Tablet by mouth two (2) times a day for 5 days.   Qty: 10 Tablet, Refills: 0  Start date: 10/10/2022, End date: 10/15/2022           CONTINUE these medications which have NOT CHANGED    Details   predniSONE (STERAPRED) 5 mg dose pack See administration instruction per 5mg dose pack  Qty: 21 Tablet, Refills: 0           2. Follow-up Information       Follow up With Specialties Details Why Clarke Brown MD Internal Medicine Physician In 3 days  [address]  [place] [address]  [phone]      [facility] EMERGENCY DEPT Emergency Medicine  If symptoms worsen [address] [place] [address]  [phone]          3. Return to ED if worse     Diagnosis     Clinical Impression:   1. Acute exacerbation of chronic obstructive pulmonary disease (COPD) ([facility])        Attestations:    Melonie Aceves MD        Please note that this dictation was completed with [employer], the computer voice recognition software. Quite often unanticipated grammatical, syntax, homophones, and other interpretive errors are inadvertently transcribed by the computer software. Please disregard these errors. Please excuse any errors that have escaped final proofreading. Thank you.

## 2022-10-11 NOTE — DISCHARGE INSTRUCTIONS
You were seen in the ER for your symptoms. Please use the steroids and antibiotics. Please follow-up with your primary care doctor. Return for new or worsening symptoms anytime.

## 2022-10-11 NOTE — ED NOTES
RN put mepelex to pt sacrum area for redness. Pt transported back to [facility]. Daughter updated and report called to facility.

## 2022-10-12 LAB
ATRIAL RATE: 89 BPM
CALCULATED P AXIS, ECG09: 75 DEGREES
CALCULATED R AXIS, ECG10: 69 DEGREES
CALCULATED T AXIS, ECG11: 87 DEGREES
DIAGNOSIS, 93000: NORMAL
P-R INTERVAL, ECG05: 128 MS
Q-T INTERVAL, ECG07: 362 MS
QRS DURATION, ECG06: 76 MS
QTC CALCULATION (BEZET), ECG08: 440 MS
VENTRICULAR RATE, ECG03: 89 BPM

## 2022-10-21 ENCOUNTER — APPOINTMENT (OUTPATIENT)
Dept: GENERAL RADIOLOGY | Age: 77
DRG: 178 | End: 2022-10-21
Attending: EMERGENCY MEDICINE
Payer: MEDICARE

## 2022-10-21 ENCOUNTER — HOSPITAL ENCOUNTER (INPATIENT)
Age: 77
LOS: 3 days | Discharge: HOSPICE | DRG: 178 | End: 2022-10-24
Attending: EMERGENCY MEDICINE | Admitting: EMERGENCY MEDICINE
Payer: MEDICARE

## 2022-10-21 DIAGNOSIS — J12.82 PNEUMONIA DUE TO COVID-19 VIRUS: Primary | ICD-10-CM

## 2022-10-21 DIAGNOSIS — U07.1 PNEUMONIA DUE TO COVID-19 VIRUS: Primary | ICD-10-CM

## 2022-10-21 DIAGNOSIS — J44.1 COPD EXACERBATION (HCC): ICD-10-CM

## 2022-10-21 PROBLEM — J18.9 HOSPITAL ACQUIRED PNA: Status: ACTIVE | Noted: 2022-10-21
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
## 2022-10-21 PROBLEM — R91.8 PULMONARY MASS: Status: ACTIVE | Noted: 2022-10-21

## 2022-10-21 LAB
ANION GAP SERPL CALC-SCNC: 3 MMOL/L (ref 3–18)
ARTERIAL PATENCY WRIST A: POSITIVE
BASE EXCESS BLD CALC-SCNC: 7 MMOL/L
BASOPHILS # BLD: 0 K/UL (ref 0–0.1)
BASOPHILS NFR BLD: 1 % (ref 0–2)
BDY SITE: ABNORMAL
BODY TEMPERATURE: 99.5
BUN SERPL-MCNC: 18 MG/DL (ref 7–18)
BUN/CREAT SERPL: 22 (ref 12–20)
CALCIUM SERPL-MCNC: 9.2 MG/DL (ref 8.5–10.1)
CHLORIDE SERPL-SCNC: 102 MMOL/L (ref 100–111)
CO2 SERPL-SCNC: 34 MMOL/L (ref 21–32)
COVID-19 RAPID TEST, COVR: DETECTED
CREAT SERPL-MCNC: 0.83 MG/DL (ref 0.6–1.3)
DIFFERENTIAL METHOD BLD: ABNORMAL
EOSINOPHIL # BLD: 0.1 K/UL (ref 0–0.4)
EOSINOPHIL NFR BLD: 2 % (ref 0–5)
ERYTHROCYTE [DISTWIDTH] IN BLOOD BY AUTOMATED COUNT: 15.4 % (ref 11.6–14.5)
FLUAV AG NPH QL IA: NEGATIVE
FLUBV AG NOSE QL IA: NEGATIVE
GAS FLOW.O2 O2 DELIVERY SYS: ABNORMAL L/MIN
GAS FLOW.O2 SETTING OXYMISER: 20 BPM
GLUCOSE SERPL-MCNC: 200 MG/DL (ref 74–99)
HCO3 BLD-SCNC: 32.3 MMOL/L (ref 22–26)
HCT VFR BLD AUTO: 34.4 % (ref 35–45)
HGB BLD-MCNC: 11.1 G/DL (ref 12–16)
IMM GRANULOCYTES # BLD AUTO: 0 K/UL (ref 0–0.04)
IMM GRANULOCYTES NFR BLD AUTO: 0 % (ref 0–0.5)
LACTATE BLD-SCNC: 1.89 MMOL/L (ref 0.4–2)
LACTATE SERPL-SCNC: 2 MMOL/L (ref 0.4–2)
LYMPHOCYTES # BLD: 1.7 K/UL (ref 0.9–3.6)
LYMPHOCYTES NFR BLD: 28 % (ref 21–52)
MCH RBC QN AUTO: 34.9 PG (ref 24–34)
MCHC RBC AUTO-ENTMCNC: 32.3 G/DL (ref 31–37)
MCV RBC AUTO: 108.2 FL (ref 78–100)
MONOCYTES # BLD: 0.8 K/UL (ref 0.05–1.2)
MONOCYTES NFR BLD: 13 % (ref 3–10)
NEUTS SEG # BLD: 3.4 K/UL (ref 1.8–8)
NEUTS SEG NFR BLD: 56 % (ref 40–73)
NRBC # BLD: 0 K/UL (ref 0–0.01)
NRBC BLD-RTO: 0 PER 100 WBC
O2/TOTAL GAS SETTING VFR VENT: 2 %
PCO2 BLD: 48.9 MMHG (ref 35–45)
PH BLD: 7.43 [PH] (ref 7.35–7.45)
PLATELET # BLD AUTO: 250 K/UL (ref 135–420)
PMV BLD AUTO: 10.2 FL (ref 9.2–11.8)
PO2 BLD: 91 MMHG (ref 80–100)
POTASSIUM SERPL-SCNC: 4 MMOL/L (ref 3.5–5.5)
RBC # BLD AUTO: 3.18 M/UL (ref 4.2–5.3)
SAO2 % BLD: 96.9 % (ref 92–97)
SERVICE CMNT-IMP: ABNORMAL
SODIUM SERPL-SCNC: 139 MMOL/L (ref 136–145)
SOURCE, COVRS: ABNORMAL
SPECIMEN TYPE: ABNORMAL
WBC # BLD AUTO: 6.1 K/UL (ref 4.6–13.2)

## 2022-10-21 PROCEDURE — 93005 ELECTROCARDIOGRAM TRACING: CPT

## 2022-10-21 PROCEDURE — 87804 INFLUENZA ASSAY W/OPTIC: CPT

## 2022-10-21 PROCEDURE — 94640 AIRWAY INHALATION TREATMENT: CPT

## 2022-10-21 PROCEDURE — 83605 ASSAY OF LACTIC ACID: CPT

## 2022-10-21 PROCEDURE — 65270000029 HC RM PRIVATE

## 2022-10-21 PROCEDURE — 74011250636 HC RX REV CODE- 250/636: Performed by: EMERGENCY MEDICINE

## 2022-10-21 PROCEDURE — 96375 TX/PRO/DX INJ NEW DRUG ADDON: CPT

## 2022-10-21 PROCEDURE — 85025 COMPLETE CBC W/AUTO DIFF WBC: CPT

## 2022-10-21 PROCEDURE — 87635 SARS-COV-2 COVID-19 AMP PRB: CPT

## 2022-10-21 PROCEDURE — 80048 BASIC METABOLIC PNL TOTAL CA: CPT

## 2022-10-21 PROCEDURE — 99285 EMERGENCY DEPT VISIT HI MDM: CPT

## 2022-10-21 PROCEDURE — 71045 X-RAY EXAM CHEST 1 VIEW: CPT

## 2022-10-21 PROCEDURE — 87040 BLOOD CULTURE FOR BACTERIA: CPT

## 2022-10-21 PROCEDURE — 36600 WITHDRAWAL OF ARTERIAL BLOOD: CPT

## 2022-10-21 PROCEDURE — 74011000258 HC RX REV CODE- 258: Performed by: EMERGENCY MEDICINE

## 2022-10-21 PROCEDURE — 96374 THER/PROPH/DIAG INJ IV PUSH: CPT

## 2022-10-21 PROCEDURE — 82803 BLOOD GASES ANY COMBINATION: CPT

## 2022-10-21 PROCEDURE — 74011000250 HC RX REV CODE- 250: Performed by: EMERGENCY MEDICINE

## 2022-10-21 RX ORDER — VANCOMYCIN HYDROCHLORIDE
1250 ONCE
Status: DISCONTINUED | OUTPATIENT
Start: 2022-10-21 | End: 2022-10-22

## 2022-10-21 RX ORDER — SODIUM CHLORIDE 0.9 % (FLUSH) 0.9 %
5-10 SYRINGE (ML) INJECTION AS NEEDED
Status: DISCONTINUED | OUTPATIENT
Start: 2022-10-21 | End: 2022-10-22

## 2022-10-21 RX ORDER — LEVOFLOXACIN 5 MG/ML
750 INJECTION, SOLUTION INTRAVENOUS EVERY 24 HOURS
Status: DISCONTINUED | OUTPATIENT
Start: 2022-10-21 | End: 2022-10-22

## 2022-10-21 RX ORDER — SODIUM CHLORIDE 9 MG/ML
150 INJECTION, SOLUTION INTRAVENOUS CONTINUOUS
Status: DISCONTINUED | OUTPATIENT
Start: 2022-10-21 | End: 2022-10-22

## 2022-10-21 RX ORDER — IPRATROPIUM BROMIDE AND ALBUTEROL SULFATE 2.5; .5 MG/3ML; MG/3ML
3 SOLUTION RESPIRATORY (INHALATION)
Status: COMPLETED | OUTPATIENT
Start: 2022-10-21 | End: 2022-10-21

## 2022-10-21 RX ADMIN — LEVOFLOXACIN 750 MG: 5 INJECTION, SOLUTION INTRAVENOUS at 22:36

## 2022-10-21 RX ADMIN — PIPERACILLIN AND TAZOBACTAM 4.5 G: 4; .5 INJECTION, POWDER, FOR SOLUTION INTRAVENOUS at 22:38

## 2022-10-21 RX ADMIN — METHYLPREDNISOLONE SODIUM SUCCINATE 125 MG: 125 INJECTION, POWDER, FOR SOLUTION INTRAMUSCULAR; INTRAVENOUS at 21:04

## 2022-10-21 RX ADMIN — SODIUM CHLORIDE 500 ML: 9 INJECTION, SOLUTION INTRAVENOUS at 21:03

## 2022-10-21 RX ADMIN — IPRATROPIUM BROMIDE AND ALBUTEROL SULFATE 3 ML: .5; 3 SOLUTION RESPIRATORY (INHALATION) at 21:03

## 2022-10-21 RX ADMIN — SODIUM CHLORIDE 150 ML/HR: 9 INJECTION, SOLUTION INTRAVENOUS at 22:33

## 2022-10-21 NOTE — ED TRIAGE NOTES
Pt arrives to ER via EMS, sent by [facility] rehab. Rehab reports pt has been desatting and has developed worsening cough. Pt w hx COPD, rehab reports she is to be d/c to hospice on Monday however at this time is full code. Pt denies chest pain, shortness of breath, c/o sneezing.

## 2022-10-22 LAB
ANION GAP SERPL CALC-SCNC: 9 MMOL/L (ref 3–18)
B PERT DNA SPEC QL NAA+PROBE: NOT DETECTED
BORDETELLA PARAPERTUSSIS PCR, BORPAR: NOT DETECTED
BUN SERPL-MCNC: 16 MG/DL (ref 7–18)
BUN/CREAT SERPL: 18 (ref 12–20)
C PNEUM DNA SPEC QL NAA+PROBE: NOT DETECTED
CALCIUM SERPL-MCNC: 8.6 MG/DL (ref 8.5–10.1)
CHLORIDE SERPL-SCNC: 103 MMOL/L (ref 100–111)
CO2 SERPL-SCNC: 27 MMOL/L (ref 21–32)
CREAT SERPL-MCNC: 0.87 MG/DL (ref 0.6–1.3)
FLUAV SUBTYP SPEC NAA+PROBE: NOT DETECTED
FLUBV RNA SPEC QL NAA+PROBE: NOT DETECTED
GLUCOSE SERPL-MCNC: 216 MG/DL (ref 74–99)
HADV DNA SPEC QL NAA+PROBE: NOT DETECTED
HCOV 229E RNA SPEC QL NAA+PROBE: NOT DETECTED
HCOV HKU1 RNA SPEC QL NAA+PROBE: NOT DETECTED
HCOV NL63 RNA SPEC QL NAA+PROBE: NOT DETECTED
HCOV OC43 RNA SPEC QL NAA+PROBE: NOT DETECTED
HMPV RNA SPEC QL NAA+PROBE: NOT DETECTED
HPIV1 RNA SPEC QL NAA+PROBE: NOT DETECTED
HPIV2 RNA SPEC QL NAA+PROBE: NOT DETECTED
HPIV3 RNA SPEC QL NAA+PROBE: NOT DETECTED
HPIV4 RNA SPEC QL NAA+PROBE: NOT DETECTED
M PNEUMO DNA SPEC QL NAA+PROBE: NOT DETECTED
POTASSIUM SERPL-SCNC: 3.9 MMOL/L (ref 3.5–5.5)
RSV RNA SPEC QL NAA+PROBE: NOT DETECTED
RV+EV RNA SPEC QL NAA+PROBE: NOT DETECTED
SARS-COV-2 PCR, COVPCR: DETECTED
SODIUM SERPL-SCNC: 139 MMOL/L (ref 136–145)

## 2022-10-22 PROCEDURE — 77010033678 HC OXYGEN DAILY

## 2022-10-22 PROCEDURE — 65270000029 HC RM PRIVATE

## 2022-10-22 PROCEDURE — 80048 BASIC METABOLIC PNL TOTAL CA: CPT

## 2022-10-22 PROCEDURE — 94640 AIRWAY INHALATION TREATMENT: CPT

## 2022-10-22 PROCEDURE — 36415 COLL VENOUS BLD VENIPUNCTURE: CPT

## 2022-10-22 PROCEDURE — 0202U NFCT DS 22 TRGT SARS-COV-2: CPT

## 2022-10-22 PROCEDURE — 74011000250 HC RX REV CODE- 250: Performed by: INTERNAL MEDICINE

## 2022-10-22 PROCEDURE — 74011250636 HC RX REV CODE- 250/636: Performed by: EMERGENCY MEDICINE

## 2022-10-22 RX ORDER — INSULIN GLARGINE 100 [IU]/ML
10 INJECTION, SOLUTION SUBCUTANEOUS DAILY
Status: DISCONTINUED | OUTPATIENT
Start: 2022-10-22 | End: 2022-10-22

## 2022-10-22 RX ORDER — DEXAMETHASONE SODIUM PHOSPHATE 4 MG/ML
2 INJECTION, SOLUTION INTRA-ARTICULAR; INTRALESIONAL; INTRAMUSCULAR; INTRAVENOUS; SOFT TISSUE
Status: DISCONTINUED | OUTPATIENT
Start: 2022-10-22 | End: 2022-10-24 | Stop reason: HOSPADM

## 2022-10-22 RX ORDER — LORAZEPAM 1 MG/1
1 TABLET ORAL
Status: DISCONTINUED | OUTPATIENT
Start: 2022-10-22 | End: 2022-10-24 | Stop reason: HOSPADM

## 2022-10-22 RX ORDER — MIDAZOLAM HYDROCHLORIDE 1 MG/ML
1 INJECTION, SOLUTION INTRAMUSCULAR; INTRAVENOUS
Status: DISCONTINUED | OUTPATIENT
Start: 2022-10-22 | End: 2022-10-24 | Stop reason: HOSPADM

## 2022-10-22 RX ORDER — SCOLOPAMINE TRANSDERMAL SYSTEM 1 MG/1
1 PATCH, EXTENDED RELEASE TRANSDERMAL
Status: DISCONTINUED | OUTPATIENT
Start: 2022-10-22 | End: 2022-10-24 | Stop reason: HOSPADM

## 2022-10-22 RX ORDER — ALBUMIN HUMAN 250 G/1000ML
25 SOLUTION INTRAVENOUS ONCE
Status: DISCONTINUED | OUTPATIENT
Start: 2022-10-22 | End: 2022-10-22

## 2022-10-22 RX ORDER — AMOXICILLIN 250 MG
2 CAPSULE ORAL
Status: DISCONTINUED | OUTPATIENT
Start: 2022-10-22 | End: 2022-10-24 | Stop reason: HOSPADM

## 2022-10-22 RX ORDER — ONDANSETRON 4 MG/1
4 TABLET, ORALLY DISINTEGRATING ORAL
Status: DISCONTINUED | OUTPATIENT
Start: 2022-10-22 | End: 2022-10-24 | Stop reason: HOSPADM

## 2022-10-22 RX ORDER — BUDESONIDE 0.5 MG/2ML
500 INHALANT ORAL
Status: DISCONTINUED | OUTPATIENT
Start: 2022-10-22 | End: 2022-10-22

## 2022-10-22 RX ORDER — IPRATROPIUM BROMIDE AND ALBUTEROL SULFATE 2.5; .5 MG/3ML; MG/3ML
3 SOLUTION RESPIRATORY (INHALATION)
Status: DISCONTINUED | OUTPATIENT
Start: 2022-10-22 | End: 2022-10-22

## 2022-10-22 RX ORDER — ARFORMOTEROL TARTRATE 15 UG/2ML
15 SOLUTION RESPIRATORY (INHALATION)
Status: DISCONTINUED | OUTPATIENT
Start: 2022-10-22 | End: 2022-10-22

## 2022-10-22 RX ORDER — MORPHINE SULFATE 2 MG/ML
2 INJECTION, SOLUTION INTRAMUSCULAR; INTRAVENOUS
Status: DISCONTINUED | OUTPATIENT
Start: 2022-10-22 | End: 2022-10-24 | Stop reason: HOSPADM

## 2022-10-22 RX ADMIN — IPRATROPIUM BROMIDE AND ALBUTEROL SULFATE 3 ML: .5; 3 SOLUTION RESPIRATORY (INHALATION) at 07:21

## 2022-10-22 RX ADMIN — ARFORMOTEROL TARTRATE 15 MCG: 15 SOLUTION RESPIRATORY (INHALATION) at 07:22

## 2022-10-22 RX ADMIN — VANCOMYCIN HYDROCHLORIDE 1250 MG: 10 INJECTION, POWDER, LYOPHILIZED, FOR SOLUTION INTRAVENOUS at 00:50

## 2022-10-22 RX ADMIN — BUDESONIDE 500 MCG: 0.5 INHALANT RESPIRATORY (INHALATION) at 07:21

## 2022-10-22 NOTE — ROUTINE PROCESS
Chart entered due to role as RRT nurse. MEWS score 3. Patient is comfort measures only at this time.

## 2022-10-22 NOTE — H&P
History & Physical    Patient: Jesi Douglas MRN: [id number]  CSN: [id number]    YOB: 1945  Age: 68 y.o. Sex: female      DOA: 10/21/2022    Chief Complaint:   Chief Complaint   Patient presents with    Cough          HPI:     Jesi Douglas is a 68 y.o.  female who has history of COPD, lung mass, hypertension, asthma, A. fib legally blind  Presents to the emergency room from rehab with worsening hypoxemia and congestion patient is supposed to be discharged on Monday to hospice. In the interim her daughter was diagnosed with an aneurysm [facility]  Apparently hospital bed and oxygen is post be delivered on Monday this was arranged from rehab. I spoke to her niece Ms. Minda Thrasher who is her temporary guardian they would like to resume hospice care while she is in the hospital she has tested positive for COVID. Apparently she was admitted to [facility] with COVID about a month ago at which time she had a bronchoscopy. She is followed by Dr. Nancy Sanz. The patient brings in her advanced directives and after discussion with her niece and patient she wishes to be comfort measures and to transition to hospice like while in the hospital.  We did we discussed with nursing supervisor she can have visits. If PCR for COVID will also be obtained. In the emergency room she had episodes of hypotension bradycardia and hypoxemia requiring up to 5 L of oxygen.   We will get a  consult and admit under hospice and comfort      Past Medical History:   Diagnosis Date    Arrhythmia     a-fib, history    Asthma     Cancer ([facility] [address] 10/2021    right lung    Chronic obstructive pulmonary disease ([facility] [address]     Hypotension 2020    Ill-defined condition 1988    PATIENT IS BLIND    Ill-defined condition     has a pessary    OA (osteoarthritis)        Past Surgical History:   Procedure Laterality Date    COLONOSCOPY N/A 3/19/2018    COLONOSCOPY  performed by Rich Worley MD at [facility] ENDOSCOPY    HX HEENT      numerous eye surgeries    HX LAP CHOLECYSTECTOMY      HX TUBAL LIGATION Bilateral        History reviewed. No pertinent family history. Social History     Socioeconomic History    Marital status:    Tobacco Use    Smoking status: Some Days     Packs/day: 0.25     Years: 58.00     Pack years: 14.50     Types: Cigarettes    Smokeless tobacco: Never    Tobacco comments:     No smoking 24 hours prior to EBUS   Substance and Sexual Activity    Alcohol use: No    Drug use: No       Prior to Admission medications    Medication Sig Start Date End Date Taking? Authorizing Provider   predniSONE (STERAPRED) 5 mg dose pack See administration instruction per 5mg dose pack 22   Nadir Hodges MD   doxycycline (MONODOX) 100 mg capsule Take 1 Capsule by mouth two (2) times a day. 22   Nadir Hodges MD   pantoprazole (Protonix) 40 mg tablet Take 1 Tablet by mouth daily. Take medication while on prednisone. 22   Nadir Hodges MD   aspirin delayed-release 81 mg tablet Take 1 Tablet by mouth daily. Hold medication while on po prednisone 22   Nadir Hodges MD   Nicotine 21-14-7 mg/24 hr ptds 1 Patch by TransDERmal route daily. 22   Nadir Hodges MD   fluticasone/umeclidin/vilanter (TRELEGY ELLIPTA IN) Take 1 Puff by inhalation daily. Indications: COPD    Provider, Historical   midodrine HCl (MIDODRINE PO) Take 5 mg by mouth three (3) times daily.  Indications: hypotension    Provider, Historical       Allergies   Allergen Reactions    Sulfa (Sulfonamide Antibiotics) Shortness of Breath         Review of Systems  Limited  Physical Exam:     Physical Exam:  Visit Vitals  BP (!) 101/50   Pulse 97   Temp 99.5 °F (37.5 °C)   Resp 22   Ht 5' (1.524 m)   Wt 49.9 kg (110 lb)   SpO2 100%   BMI 21.48 kg/m²    O2 Flow Rate (L/min): 5 l/min O2 Device: Nasal cannula    Temp (24hrs), Av.5 °F (37.5 °C), Min:99.5 °F (37.5 °C), Max:99.5 °F (37.5 °C)    10/21 1901 - 10/22 0700  In: 500 [I.V.:500]  Out: -    No intake/output data recorded. General:  Alert, cooperative, no distress, appears stated age. She wishes that I asked all questions to her niece              Head: Normocephalic, without obvious abnormality, atraumatic. Eyes:  Conjunctivae/corneas clear. PERRL, EOMs intact. Nose: Nares normal. No drainage or sinus tenderness. Neck: Supple, symmetrical, trachea midline, no adenopathy, thyroid: no enlargement, no carotid bruit and no JVD. Lungs:   Clear to auscultation bilaterally. Heart:  Regular rate and rhythm, S1, S2 normal.  Irregularly irregular and tacky     Abdomen: Soft, non-tender. Bowel sounds normal.    Extremities: Extremities normal, atraumatic, no cyanosis or edema. Pulses: 2+ and symmetric all extremities. Skin:  No rashes or lesions     Neurologic: AAOx3, No focal motor or sensory deficit. Labs Reviewed: All lab results for the last 24 hours reviewed. and EKG    Procedures/imaging: see electronic medical records for all procedures/Xrays and details which were not copied into this note but were reviewed prior to creation of Plan      Assessment/Plan     Active Problems:        1. COVID  2. COPD exacerbation    3. Sacral decub    4. Lung mass    5. Legal blindness    6.   Sacral decub     consult  Start comfort measures  Transition to hospice when appropriate and this can be arranged but this was tentatively planned for Monday  DVT/GI Prophylaxis: SCD's        Nikole Garcia MD  10/21/2022 10:22 PM

## 2022-10-22 NOTE — ED NOTES
TRANSFER - OUT REPORT:    Verbal report given to Teri Mcnamara RN on Mary Ellen Marie  being transferred to [facility] room 337 for routine progression of care       Report consisted of patients Situation, Background, Assessment and   Recommendations(SBAR). Information from the following report(s) SBAR, ED Summary, Procedure Summary, Intake/Output, MAR, and Recent Results was reviewed with the receiving nurse. Lines:   Peripheral IV 10/21/22 Left Antecubital (Active)   Site Assessment Clean, dry, & intact 10/21/22 1844   Dressing Status Clean, dry, & intact 10/21/22 1844       Peripheral IV 10/21/22 Right Forearm (Active)   Site Assessment Clean, dry, & intact 10/21/22 1844   Dressing Status Clean, dry, & intact 10/21/22 1844        Opportunity for questions and clarification was provided.       Patient transported with:   Monitor  O2 @ 2 liters  Registered Nurse

## 2022-10-22 NOTE — PROGRESS NOTES
TRANSFER - IN REPORT:    Verbal report received from AARON Delgado(name) on Katy Gordon  being received from AARON Cronin(unit) for routine progression of care      Report consisted of patients Situation, Background, Assessment and   Recommendations(SBAR). Information from the following report(s) SBAR, Kardex, Intake/Output, and MAR was reviewed with the receiving nurse. Opportunity for questions and clarification was provided. Assessment completed upon patients arrival to unit and care assumed. Patient transferred from [facility] with PNE, COPD, and Covid. Patient A&O x4 and blind. Patient lungs diminished with O2 sats 95% on 4 liters. Patient placed Stage II sacral decub with redness. Mepilex intact. 0730 - Bedside and Verbal shift change report given to DARRIAN Kay RN (oncoming nurse) by Madina Herring (offgoing nurse). Report included the following information SBAR, Kardex, Intake/Output, and MAR.

## 2022-10-22 NOTE — ED PROVIDER NOTES
EMERGENCY DEPARTMENT HISTORY AND PHYSICAL EXAM    Date: 10/21/2022  Patient Name: Edgard Valle    History of Presenting Illness     Chief Complaint   Patient presents with    Cough         History Provided By: Patient    Additional History (Context):   7:19 PM  Edgard Valle is a 68 y.o. female with PMHX of same as COPD now O2 dependent, asthma, hypertension, right lung cancer diagnosed in October 2021, paroxysmal A. fib who presents to the emergency department C/O pneumonia. Patient was diagnosed with emphysema COPD not too long ago and is currently being evaluated for potential transfer over to palliative care. She had more respiratory difficulty and [facility] did some evaluations including ordering chest x-ray however these were never performed. She was transferred over here for evaluation. She has significant nausea no active vomiting. She has a chronic problem swallowing liquids and often takes thickened material.  There is no history of fevers. Social History  No smoking drinking or drugs    Family History  No family history of documented. None available per patient      PCP: Shelia Costa MD    Current Facility-Administered Medications   Medication Dose Route Frequency Provider Last Rate Last Admin    0.9% sodium chloride infusion  150 mL/hr IntraVENous CONTINUOUS Eve Kim MD        sodium chloride (NS) flush 5-10 mL  5-10 mL IntraVENous PRN Eve Kim MD         Current Outpatient Medications   Medication Sig Dispense Refill    predniSONE (STERAPRED) 5 mg dose pack See administration instruction per 5mg dose pack 21 Tablet 0    doxycycline (MONODOX) 100 mg capsule Take 1 Capsule by mouth two (2) times a day. 10 Capsule 0    pantoprazole (Protonix) 40 mg tablet Take 1 Tablet by mouth daily. Take medication while on prednisone. 10 Tablet 0    aspirin delayed-release 81 mg tablet Take 1 Tablet by mouth daily.  Hold medication while on po prednisone 30 Tablet 0    Nicotine 21-14-7 mg/24 hr ptds 1 Patch by TransDERmal route daily. 56 Patch 0    fluticasone/umeclidin/vilanter (TRELEGY ELLIPTA IN) Take 1 Puff by inhalation daily. Indications: COPD      midodrine HCl (MIDODRINE PO) Take 5 mg by mouth three (3) times daily. Indications: hypotension         Past History     Past Medical History:  Past Medical History:   Diagnosis Date    Arrhythmia     a-fib, history    Asthma     Cancer ([address] 10/2021    right lung    Chronic obstructive pulmonary disease (HCC)     Hypotension 2020    Ill-defined condition 1988    PATIENT IS BLIND    Ill-defined condition     has a pessary    OA (osteoarthritis)        Past Surgical History:  Past Surgical History:   Procedure Laterality Date    COLONOSCOPY N/A 3/19/2018    COLONOSCOPY  performed by Rani Cantrell MD at [facility] ENDOSCOPY    HX HEENT      numerous eye surgeries    HX LAP CHOLECYSTECTOMY      HX TUBAL LIGATION Bilateral        Family History:  History reviewed. No pertinent family history. Social History:  Social History     Tobacco Use    Smoking status: Some Days     Packs/day: 0.25     Years: 58.00     Pack years: 14.50     Types: Cigarettes    Smokeless tobacco: Never    Tobacco comments:     No smoking 24 hours prior to EBUS   Substance Use Topics    Alcohol use: No    Drug use: No       Allergies: Allergies   Allergen Reactions    Sulfa (Sulfonamide Antibiotics) Shortness of Breath         Review of Systems   Review of Systems   HENT:  Positive for trouble swallowing. Respiratory:  Positive for cough. Gastrointestinal:  Positive for nausea. All other systems reviewed and are negative. Physical Exam     Vitals:    10/21/22 1808   BP: (!) 111/49   Pulse: (!) 106   Resp: 22   Temp: 99.5 °F (37.5 °C)   SpO2: 100%   Weight: 49.9 kg (110 lb)   Height: 5' (1.524 m)     Physical Exam  Vitals and nursing note reviewed. Constitutional:       General: She is not in acute distress. Appearance: She is underweight.  She is not diaphoretic. Interventions: Nasal cannula in place. Comments: Elderly female frail appealing with mild respiratory difficulty. HENT:      Head: Normocephalic and atraumatic. Mouth/Throat:      Mouth: Mucous membranes are dry. Eyes:      General: No scleral icterus. Extraocular Movements:      Right eye: Normal extraocular motion. Left eye: Normal extraocular motion. Conjunctiva/sclera: Conjunctivae normal.      Pupils: Pupils are equal, round, and reactive to light. Neck:      Trachea: No tracheal deviation. Cardiovascular:      Rate and Rhythm: Normal rate and regular rhythm. Heart sounds: Normal heart sounds. Pulmonary:      Effort: Pulmonary effort is normal. No respiratory distress. Breath sounds: No stridor. Wheezing and rales present. Comments: Scattered wheezing to all lung fields. Few scattered crackles. Abdominal:      General: Bowel sounds are normal. There is no distension. Palpations: Abdomen is soft. Tenderness: There is no abdominal tenderness. There is no rebound. Musculoskeletal:         General: No tenderness. Normal range of motion. Cervical back: Normal range of motion and neck supple. Comments: Grossly unremarkable without abnormalities   Skin:     General: Skin is warm and dry. Capillary Refill: Capillary refill takes 2 to 3 seconds. Findings: No erythema or rash. Neurological:      Mental Status: She is alert and oriented to person, place, and time. Cranial Nerves: No cranial nerve deficit. Motor: No weakness. Psychiatric:         Mood and Affect: Mood normal.         Behavior: Behavior normal.         Thought Content:  Thought content normal.         Judgment: Judgment normal.     Diagnostic Study Results     Labs -  Recent Results (from the past 24 hour(s))   POC LACTIC ACID    Collection Time: 10/21/22  6:26 PM   Result Value Ref Range    Lactic Acid (POC) 1.89 0.40 - 3.49 mmol/L   METABOLIC PANEL, BASIC    Collection Time: 10/21/22  6:45 PM   Result Value Ref Range    Sodium 139 136 - 145 mmol/L    Potassium 4.0 3.5 - 5.5 mmol/L    Chloride 102 100 - 111 mmol/L    CO2 34 (H) 21 - 32 mmol/L    Anion gap 3 3.0 - 18 mmol/L    Glucose 200 (H) 74 - 99 mg/dL    BUN 18 7.0 - 18 MG/DL    Creatinine 0.83 0.6 - 1.3 MG/DL    BUN/Creatinine ratio 22 (H) 12 - 20      eGFR >60 >60 ml/min/1.73m2    Calcium 9.2 8.5 - 10.1 MG/DL   CBC WITH AUTOMATED DIFF    Collection Time: 10/21/22  6:45 PM   Result Value Ref Range    WBC 6.1 4.6 - 13.2 K/uL    RBC 3.18 (L) 4.20 - 5.30 M/uL    HGB 11.1 (L) 12.0 - 16.0 g/dL    HCT 34.4 (L) 35.0 - 45.0 %    .2 (H) 78.0 - 100.0 FL    MCH 34.9 (H) 24.0 - 34.0 PG    MCHC 32.3 31.0 - 37.0 g/dL    RDW 15.4 (H) 11.6 - 14.5 %    PLATELET 875 894 - 801 K/uL    MPV 10.2 9.2 - 11.8 FL    NRBC 0.0 0  WBC    ABSOLUTE NRBC 0.00 0.00 - 0.01 K/uL    NEUTROPHILS 56 40 - 73 %    LYMPHOCYTES 28 21 - 52 %    MONOCYTES 13 (H) 3 - 10 %    EOSINOPHILS 2 0 - 5 %    BASOPHILS 1 0 - 2 %    IMMATURE GRANULOCYTES 0 0.0 - 0.5 %    ABS. NEUTROPHILS 3.4 1.8 - 8.0 K/UL    ABS. LYMPHOCYTES 1.7 0.9 - 3.6 K/UL    ABS. MONOCYTES 0.8 0.05 - 1.2 K/UL    ABS. EOSINOPHILS 0.1 0.0 - 0.4 K/UL    ABS. BASOPHILS 0.0 0.0 - 0.1 K/UL    ABS. IMM.  GRANS. 0.0 0.00 - 0.04 K/UL    DF AUTOMATED     LACTIC ACID    Collection Time: 10/21/22  6:45 PM   Result Value Ref Range    Lactic acid 2.0 0.4 - 2.0 MMOL/L   INFLUENZA A & B AG (RAPID TEST)    Collection Time: 10/21/22  6:45 PM   Result Value Ref Range    Influenza A Antigen Negative NEG      Influenza B Antigen Negative NEG     COVID-19 RAPID TEST    Collection Time: 10/21/22  6:45 PM   Result Value Ref Range    Specimen source Nasopharyngeal      COVID-19 rapid test Detected (AA) NOTD     BLOOD GAS, ARTERIAL POC    Collection Time: 10/21/22  8:46 PM   Result Value Ref Range    Device: NASAL CANNULA      FIO2 (POC) 2 %    pH (POC) 7.43 7.35 - 7.45      pCO2 (POC) 48.9 (H) 35.0 - 45.0 MMHG    pO2 (POC) 91 80 - 100 MMHG    HCO3 (POC) 32.3 (H) 22 - 26 MMOL/L    sO2 (POC) 96.9 92 - 97 %    Base excess (POC) 7.0 mmol/L    Set Rate 20 bpm    Allens test (POC) Positive      Site LEFT RADIAL      Patient temp. 99.5      Specimen type (POC) ARTERIAL      Performed by Adela Draper         Radiologic Studies -   XR CHEST PORT   Final Result      Unchanged scarring and consolidation throughout the right lung with no clearly   new or active cardiopulmonary disease. CT Results  (Last 48 hours)      None          CXR Results  (Last 48 hours)                 10/21/22 1907  XR CHEST PORT Final result    Impression:      Unchanged scarring and consolidation throughout the right lung with no clearly   new or active cardiopulmonary disease. Narrative:  EXAM: CHEST RADIOGRAPH, SINGLE VIEW       CLINICAL INDICATION/HISTORY: cough       COMPARISON: 10/10/2022       TECHNIQUE: Portable frontal view of the chest was obtained.        _______________       FINDINGS:       SUPPORT DEVICES: None. HEART AND MEDIASTINUM: Cardiomediastinal silhouette appears within normal   limits. Normal caliber thoracic aorta. No central vascular congestion. LUNGS AND PLEURAL SPACES: Linear fibrotic changes are seen throughout medial   aspect of the hyperinflated left lung. No acute or confluent airspace opacity   throughout the left lung. Bandlike fibrosis in density throughout right lung   apex and right lung base appear unchanged. No pleural effusion or pneumothorax. BONY THORAX AND SOFT TISSUES: No acute osseous abnormality.        _______________                   Medications given in the ED-  Medications   0.9% sodium chloride infusion (has no administration in time range)   sodium chloride (NS) flush 5-10 mL (has no administration in time range)   methylPREDNISolone (PF) (Solu-MEDROL) injection 125 mg (125 mg IntraVENous Given 10/21/22 2104)   albuterol-ipratropium (DUO-NEB) 2.5 MG-0.5 MG/3 ML (3 mL Nebulization Given 10/21/22 2103)   albuterol-ipratropium (DUO-NEB) 2.5 MG-0.5 MG/3 ML (3 mL Nebulization Given 10/21/22 2103)   sodium chloride 0.9 % bolus infusion 500 mL (500 mL IntraVENous New Bag 10/21/22 2103)         Medical Decision Making   I am the first provider for this patient. I reviewed the vital signs, available nursing notes, past medical history, past surgical history, family history and social history. Vital Signs-Reviewed the patient's vital signs. Pulse Oximetry Analysis -   100% on 5 L nasal cannula  97% on 2 L    Cardiac Monitor:  Rate: 104 bpm  Rhythm: Sinus tach    EKG interpretation: (Preliminary)  6:27 PM    Sinus tachycardia, rate 111, LVH is present. No visible ST changes. QTC is 413. EKG read by Tasneem Ross MD      Records Reviewed: NURSING NOTES AND PREVIOUS MEDICAL RECORDS    Provider Notes (Medical Decision Making): Fail elderly female being brought in the hospital for respiratory difficulty COPD. Additionally she is being found to have COVID. Blood sugars elevated although usually not a profound diabetic. She was given breathing treatments and steroids we will have to watch her blood sugars. Continue gentle IV fluids. Given lactic acid within normal limits COVID would avoid septic fluids. She will begin antibiotics discussed with hospitalist for admission. ACS pulmonary embolism UTI is also considered within infectious processes. She was going to be transition to palliative care however her original appointed POA is out of town. Other family member daughter acting as POA wishes to continued with full therapy. Procedures:  Procedures    ED Course:   7:09 PM: Initial assessment performed. The patients presenting problems have been discussed, and they are in agreement with the care plan formulated and outlined with them. I have encouraged them to ask questions as they arise throughout their visit.     Diagnosis and Disposition Critical Care Time: 65 minutes  CRITICAL CARE NOTE:  10:47 PM   I have spent 65 minutes of critical care time involved in lab review, consultations with specialist, family decision-making, and documentation. During this entire length of time I was immediately available to the patient. Critical Care: The reason for providing this level of medical care for this critically ill patient was due a critical illness that impaired one or more vital organ systems such that there was a high probability of imminent or life threatening deterioration in the patients condition. This care involved high complexity decision making to assess, manipulate, and support vital system functions, to treat this degreee vital organ system failure and to prevent further life threatening deterioration of the patients condition. Core Measures:  For Hospitalized Patients:    1. Hospitalization Decision Time:  The decision to hospitalize the patient was made by Claudette Erb, MD   at [phone] on 10/21/2022    2. Aspirin: Aspirin was not given because the patient did not present with a stroke at the time of their Emergency Department evaluation    10:47 AM  Patient is being admitted to the hospital by Dr. Limmie Burkitt. The results of their tests and reasons for their admission have been discussed with them and/or available family. They convey agreement and understanding for the need to be admitted and for their admission diagnosis. CONDITIONS ON ADMISSION:  Sepsis is not present at the time of admission. Deep Vein Thrombosis is not present at the time of admission. Thrombosis is not present at the time of admission. Pneumonia is present at the time of admission. MRSA is not present at the time of admission. Wound infection is not present at the time of admission. Pressure Ulcer is present at the time of admission. CLINICAL IMPRESSION:    1. Pneumonia due to COVID-19 virus    2.  COPD exacerbation ([facility] [address] _______________________________    This note was partially transcribed via voice recognition software. Although efforts have been made to catch any discrepancies, it may contain sound alike words, grammatical errors, or nonsensical words.

## 2022-10-22 NOTE — ED NOTES
Ok for pt to have liquids per Dr. Nito Obrien.  Pt provided ginger ale per request; thickened to honey consistency with thicken powder,

## 2022-10-22 NOTE — ROUTINE PROCESS
Bedside shift change report given to Yfn Eaton RN (oncoming nurse) by Nkechi Flores RN   (offgoing nurse). Report included the following information SBAR, Kardex, Intake/Output, and Recent Results.

## 2022-10-22 NOTE — PROGRESS NOTES
Hospitalist Progress Note    Patient: Abby Russell MRN: [id number]  CSN: [id number]    YOB: 1945  Age: 68 y.o. Sex: female    DOA: 10/21/2022 LOS:  LOS: 1 day          Chief Complaint:    SOB      Assessment/Plan        1. COVID infection    2. COPD exacerbation-no wheezing noted this am    3. Sacral decubitus    4. Lung mass    5. Legal blindness    She did not respond to me this am  Sedate and no tachypnea    Cancel neb order    Supportive  palliation planned with transfer to hospice once social situation sorted out      consult    Transition to hospice when appropriate and this can be arranged but this was tentatively planned for Monday    Disposition :  Patient Active Problem List   Diagnosis Code    COPD (chronic obstructive pulmonary disease) ([facility] J44.9    Hypoxemia requiring supplemental oxygen R09.02, Z99.81    Hypotension I95.9    Smoker F17.200    Lung mass R91.8    Blindness H54.7    Hypoxia R09.02    COPD with acute exacerbation ([facility] J44.1    Hospital acquired PNA J18.9, Y95    Pulmonary mass R91.8    COVID-19 virus RNA test result positive at limit of detection U07.1       Subjective:  No nursing issues  Patient not responsive      Review of systems:    As above      Vital signs/Intake and Output:  Visit Vitals  /67   Pulse (!) 109   Temp 97.6 °F (36.4 °C)   Resp 22   Ht 5' (1.524 m)   Wt 49.9 kg (110 lb)   SpO2 99%   BMI 21.48 kg/m²     Current Shift:  No intake/output data recorded.   Last three shifts:  10/20 1901 - 10/22 0700  In: 500 [I.V.:500]  Out: -     Exam:    General: ill appearing elderly not responsive WF  CVS:Regular rate and rhythm, no M/R/G, S1/S2 heard, no thrill  Lungs:Clear to auscultation bilaterally, no wheezes, rhonchi, or rales  Abdomen: Soft, Nontender, No distention  Extremities: No C/C/E  Neuro:does not open eyes to me                Labs: Results:       Chemistry Recent Labs     10/22/22  0304 10/21/22  1845   * 200*    139   K 3.9 4.0    102   CO2 27 34*   BUN 16 18   CREA 0.87 0.83   CA 8.6 9.2   AGAP 9 3   BUCR 18 22*      CBC w/Diff Recent Labs     10/21/22  1845   WBC 6.1   RBC 3.18*   HGB 11.1*   HCT 34.4*      GRANS 56   LYMPH 28   EOS 2      Cardiac Enzymes No results for input(s): CPK, CKND1, PAULIE in the last 72 hours. No lab exists for component: CKRMB, TROIP   Coagulation No results for input(s): PTP, INR, APTT, INREXT in the last 72 hours. Lipid Panel No results found for: CHOL, CHOLPOCT, CHOLX, CHLST, CHOLV, 548844, HDL, HDLP, LDL, LDLC, DLDLP, 897017, VLDLC, VLDL, TGLX, TRIGL, TRIGP, TGLPOCT, CHHD, CHHDX   BNP No results for input(s): BNPP in the last 72 hours. Liver Enzymes No results for input(s): TP, ALB, TBIL, AP in the last 72 hours.     No lab exists for component: SGOT, GPT, DBIL   Thyroid Studies No results found for: T4, T3U, TSH, TSHEXT     Procedures/imaging: see electronic medical records for all procedures/Xrays and details which were not copied into this note but were reviewed prior to creation of Jami Muñoz MD

## 2022-10-22 NOTE — ED PROVIDER NOTES
EMERGENCY DEPARTMENT HISTORY AND PHYSICAL EXAM    Date: 10/21/2022  Patient Name: Ramone Olvera    History of Presenting Illness     Chief Complaint   Patient presents with    Cough         History Provided By: Patient    Additional History (Context):   7:39 PM  Ramone Olvera is a 68 y.o. female with PMHX of same as pulmonary mass, COPD now O2 dependent, asthma, hypertension, right lung cancer diagnosed in October 2021, paroxysmal A. fib who presents to the emergency department C/O pneumonia. Patient was diagnosed with emphysema COPD not too long ago and is currently being evaluated for potential transfer over to palliative care. She had more respiratory difficulty and [facility] did some evaluations including ordering chest x-ray however these were never performed. She was transferred over here for evaluation. She has significant nausea no active vomiting. She has a chronic problem swallowing liquids and often takes thickened material.  There is no history of fevers. Social History  Distant history of smoking. Has not smoked in many years. No alcohol or drugs. Family History  No immunocompromise. No cancers. PCP: Shannan Edmondson MD    Current Facility-Administered Medications   Medication Dose Route Frequency Provider Last Rate Last Admin    0.9% sodium chloride infusion  150 mL/hr IntraVENous CONTINUOUS Faviola Nguyen MD        sodium chloride (NS) flush 5-10 mL  5-10 mL IntraVENous PRN Faviola Nguyen MD         Current Outpatient Medications   Medication Sig Dispense Refill    predniSONE (STERAPRED) 5 mg dose pack See administration instruction per 5mg dose pack 21 Tablet 0    doxycycline (MONODOX) 100 mg capsule Take 1 Capsule by mouth two (2) times a day. 10 Capsule 0    pantoprazole (Protonix) 40 mg tablet Take 1 Tablet by mouth daily. Take medication while on prednisone. 10 Tablet 0    aspirin delayed-release 81 mg tablet Take 1 Tablet by mouth daily.  Hold medication while on po prednisone 30 Tablet 0    Nicotine 21-14-7 mg/24 hr ptds 1 Patch by TransDERmal route daily. 56 Patch 0    fluticasone/umeclidin/vilanter (TRELEGY ELLIPTA IN) Take 1 Puff by inhalation daily. Indications: COPD      midodrine HCl (MIDODRINE PO) Take 5 mg by mouth three (3) times daily. Indications: hypotension         Past History     Past Medical History:  Past Medical History:   Diagnosis Date    Arrhythmia     a-fib, history    Asthma     Cancer ([address] 10/2021    right lung    Chronic obstructive pulmonary disease (HCC)     Hypotension 2020    Ill-defined condition 1988    PATIENT IS BLIND    Ill-defined condition     has a pessary    OA (osteoarthritis)        Past Surgical History:  Past Surgical History:   Procedure Laterality Date    COLONOSCOPY N/A 3/19/2018    COLONOSCOPY  performed by Chavo Boateng MD at [facility] ENDOSCOPY    HX HEENT      numerous eye surgeries    HX LAP CHOLECYSTECTOMY      HX TUBAL LIGATION Bilateral        Family History:  History reviewed. No pertinent family history. Social History:  Social History     Tobacco Use    Smoking status: Some Days     Packs/day: 0.25     Years: 58.00     Pack years: 14.50     Types: Cigarettes    Smokeless tobacco: Never    Tobacco comments:     No smoking 24 hours prior to EBUS   Substance Use Topics    Alcohol use: No    Drug use: No       Allergies:   Allergies   Allergen Reactions    Sulfa (Sulfonamide Antibiotics) Shortness of Breath         Review of Systems   Review of Systems    ***  Physical Exam     Vitals:    10/21/22 1808   BP: (!) 111/49   Pulse: (!) 106   Resp: 22   Temp: 99.5 °F (37.5 °C)   SpO2: 100%   Weight: 49.9 kg (110 lb)   Height: 5' (1.524 m)     Physical Exam    ***    Diagnostic Study Results     Labs -  Recent Results (from the past 24 hour(s))   POC LACTIC ACID    Collection Time: 10/21/22  6:26 PM   Result Value Ref Range    Lactic Acid (POC) 1.89 0.40 - 7.46 mmol/L   METABOLIC PANEL, BASIC Collection Time: 10/21/22  6:45 PM   Result Value Ref Range    Sodium 139 136 - 145 mmol/L    Potassium 4.0 3.5 - 5.5 mmol/L    Chloride 102 100 - 111 mmol/L    CO2 34 (H) 21 - 32 mmol/L    Anion gap 3 3.0 - 18 mmol/L    Glucose 200 (H) 74 - 99 mg/dL    BUN 18 7.0 - 18 MG/DL    Creatinine 0.83 0.6 - 1.3 MG/DL    BUN/Creatinine ratio 22 (H) 12 - 20      eGFR >60 >60 ml/min/1.73m2    Calcium 9.2 8.5 - 10.1 MG/DL   CBC WITH AUTOMATED DIFF    Collection Time: 10/21/22  6:45 PM   Result Value Ref Range    WBC 6.1 4.6 - 13.2 K/uL    RBC 3.18 (L) 4.20 - 5.30 M/uL    HGB 11.1 (L) 12.0 - 16.0 g/dL    HCT 34.4 (L) 35.0 - 45.0 %    .2 (H) 78.0 - 100.0 FL    MCH 34.9 (H) 24.0 - 34.0 PG    MCHC 32.3 31.0 - 37.0 g/dL    RDW 15.4 (H) 11.6 - 14.5 %    PLATELET 350 028 - 431 K/uL    MPV 10.2 9.2 - 11.8 FL    NRBC 0.0 0  WBC    ABSOLUTE NRBC 0.00 0.00 - 0.01 K/uL    NEUTROPHILS 56 40 - 73 %    LYMPHOCYTES 28 21 - 52 %    MONOCYTES 13 (H) 3 - 10 %    EOSINOPHILS 2 0 - 5 %    BASOPHILS 1 0 - 2 %    IMMATURE GRANULOCYTES 0 0.0 - 0.5 %    ABS. NEUTROPHILS 3.4 1.8 - 8.0 K/UL    ABS. LYMPHOCYTES 1.7 0.9 - 3.6 K/UL    ABS. MONOCYTES 0.8 0.05 - 1.2 K/UL    ABS. EOSINOPHILS 0.1 0.0 - 0.4 K/UL    ABS. BASOPHILS 0.0 0.0 - 0.1 K/UL    ABS. IMM.  GRANS. 0.0 0.00 - 0.04 K/UL    DF AUTOMATED     LACTIC ACID    Collection Time: 10/21/22  6:45 PM   Result Value Ref Range    Lactic acid 2.0 0.4 - 2.0 MMOL/L   INFLUENZA A & B AG (RAPID TEST)    Collection Time: 10/21/22  6:45 PM   Result Value Ref Range    Influenza A Antigen Negative NEG      Influenza B Antigen Negative NEG     COVID-19 RAPID TEST    Collection Time: 10/21/22  6:45 PM   Result Value Ref Range    Specimen source Nasopharyngeal      COVID-19 rapid test Detected (AA) NOTD     BLOOD GAS, ARTERIAL POC    Collection Time: 10/21/22  8:46 PM   Result Value Ref Range    Device: NASAL CANNULA      FIO2 (POC) 2 %    pH (POC) 7.43 7.35 - 7.45      pCO2 (POC) 48.9 (H) 35.0 - 45.0 MMHG    pO2 (POC) 91 80 - 100 MMHG    HCO3 (POC) 32.3 (H) 22 - 26 MMOL/L    sO2 (POC) 96.9 92 - 97 %    Base excess (POC) 7.0 mmol/L    Set Rate 20 bpm    Allens test (POC) Positive      Site LEFT RADIAL      Patient temp. 99.5      Specimen type (POC) ARTERIAL      Performed by Janie Schroeder         Radiologic Studies -   XR CHEST PORT   Final Result      Unchanged scarring and consolidation throughout the right lung with no clearly   new or active cardiopulmonary disease. CT Results  (Last 48 hours)      None          CXR Results  (Last 48 hours)                 10/21/22 1907  XR CHEST PORT Final result    Impression:      Unchanged scarring and consolidation throughout the right lung with no clearly   new or active cardiopulmonary disease. Narrative:  EXAM: CHEST RADIOGRAPH, SINGLE VIEW       CLINICAL INDICATION/HISTORY: cough       COMPARISON: 10/10/2022       TECHNIQUE: Portable frontal view of the chest was obtained.        _______________       FINDINGS:       SUPPORT DEVICES: None. HEART AND MEDIASTINUM: Cardiomediastinal silhouette appears within normal   limits. Normal caliber thoracic aorta. No central vascular congestion. LUNGS AND PLEURAL SPACES: Linear fibrotic changes are seen throughout medial   aspect of the hyperinflated left lung. No acute or confluent airspace opacity   throughout the left lung. Bandlike fibrosis in density throughout right lung   apex and right lung base appear unchanged. No pleural effusion or pneumothorax. BONY THORAX AND SOFT TISSUES: No acute osseous abnormality.        _______________                   Medications given in the ED-  Medications   0.9% sodium chloride infusion (has no administration in time range)   sodium chloride (NS) flush 5-10 mL (has no administration in time range)   methylPREDNISolone (PF) (Solu-MEDROL) injection 125 mg (125 mg IntraVENous Given 10/21/22 2104)   albuterol-ipratropium (DUO-NEB) 2.5 MG-0.5 MG/3 ML (3 mL Nebulization Given 10/21/22 2103)   albuterol-ipratropium (DUO-NEB) 2.5 MG-0.5 MG/3 ML (3 mL Nebulization Given 10/21/22 2103)   sodium chloride 0.9 % bolus infusion 500 mL (500 mL IntraVENous New Bag 10/21/22 2103)         Medical Decision Making   I am the first provider for this patient. I reviewed the vital signs, available nursing notes, past medical history, past surgical history, family history and social history. Vital Signs-Reviewed the patient's vital signs. Pulse Oximetry Analysis -100% on room air    Cardiac Monitor:  Rate: 97 bpm  Rhythm: Room air    EKG interpretation: (Preliminary)  6:27 PM    Sinus tachycardia, rate 111, nonspecific ST changes with some motion artifact, there is somewhat of a wandering baseline, LVH is present, QTC is 413 ms  EKG read by Lety Araiza MD      Records Reviewed: NURSING NOTES AND PREVIOUS MEDICAL RECORDS    Provider Notes (Medical Decision Making):   Patient with COPD which has exacerbation increased oxygen need. She is frail ill-appearing with worsening cough worsening sputum. Patient was swabbed for COVID which came back positive. She is not getting the care that she needs at the nursing home. Pulmonary toilet would be essential for women with COVID. She is not yet palliative care patient will start antibiotics steroids breathing treatments and has hospitalist to consider admission. Procedures:  Procedures    ED Course:   9:09 PM: Initial assessment performed. The patients presenting problems have been discussed, and they are in agreement with the care plan formulated and outlined with them. I have encouraged them to ask questions as they arise throughout their visit. Diagnosis and Disposition       DISCHARGE NOTE:  ***  Keaton Bhakta's  results have been reviewed with her. She has been counseled regarding her diagnosis, treatment, and plan.   She verbally conveys understanding and agreement of the signs, symptoms, diagnosis, treatment and prognosis and additionally agrees to follow up as discussed. She also agrees with the care-plan and conveys that all of her questions have been answered. I have also provided discharge instructions for her that include: educational information regarding their diagnosis and treatment, and list of reasons why they would want to return to the ED prior to their follow-up appointment, should her condition change. She has been provided with education for proper emergency department utilization. CLINICAL IMPRESSION:    No diagnosis found. PLAN:  1. D/C Home  2. Current Discharge Medication List        3. Follow-up Information    None       _______________________________    This note was partially transcribed via voice recognition software. Although efforts have been made to catch any discrepancies, it may contain sound alike words, grammatical errors, or nonsensical words.

## 2022-10-22 NOTE — PROGRESS NOTES
Reason for Admission:  Chart reviewed, per H&P, patient is a \" 68 y.o.  female who has history of COPD, lung mass, hypertension, asthma, A. fib legally blind  Presents to the emergency room from rehab with worsening hypoxemia and congestion patient is supposed to be discharged on Monday to hospice. In the interim her daughter was diagnosed with an aneurysm and is currently at [facility]..  In the emergency room she had episodes of hypotension bradycardia and hypoxemia requiring up to 5 L of oxygen. We will get a  consult and admit under hospice and comfort. \"                     RUR Score:      low, 10%               Plan for utilizing home health:      Hospice    PCP: First and Last name:  Sushil Queen MD     Name of Practice:    Are you a current patient: Yes/No:    Approximate date of last visit:    Can you participate in a virtual visit with your PCP:                     Current Advanced Directive/Advance Care Plan: DNR      Healthcare Decision Maker:   Click here to complete [place] including selection of the Healthcare Decision Maker Relationship (ie \"Primary\")             Primary Decision Maker: Alessandro Shay - Daughter - [phone]                  Transition of Care Plan:       0084: care manager had extensive discussion with family members for discharge planning - summary - Hospice coordinator is Kenneth [facility] ([phone]) - [address]; CM left message to verify hospital bed and supplies are to be delivered on Monday; spoke with current POA Eliseo Fortune ([phone]), her spouse Joi Egan, niece Meme Barrientos and her  Skip; per [place], patient's daughter Colt Matthews had emergent brain aneurysm surgery at [facility] and may have an extended recovery herself; per Kingsley, she will coordinate round the clock care in patient's home or if needed, she will take patient in her home. Patient is on droplet precautions for positive covid.           Care Management Interventions  Transition of Care Consult (CM Consult): Discharge Planning, Kee: No  Reason Outside [place]: Patient already serviced by other home care/hospice agency  Support Systems: Child(dominik), Other Family Member(s)  Confirm Follow Up Transport:  (medical transport)  The Plan for Transition of Care is Related to the Following Treatment Goals : COPD  The Patient and/or Patient Representative was Provided with a Choice of Provider and Agrees with the Discharge Plan?: Yes  Name of the Patient Representative Who was Provided with a Choice of Provider and Agrees with the Discharge Plan: robert Fuentes  Freedom of Choice List was Provided with Basic Dialogue that Supports the Patient's Individualized Plan of Care/Goals, Treatment Preferences and Shares the Quality Data Associated with the Providers?: Yes  Discharge Location  Patient Expects to be Discharged to[de-identified]  (home with hospice)

## 2022-10-23 PROCEDURE — 74011250637 HC RX REV CODE- 250/637: Performed by: HOSPITALIST

## 2022-10-23 PROCEDURE — 65270000029 HC RM PRIVATE

## 2022-10-23 RX ORDER — MORPHINE SULFATE 20 MG/ML
10 SOLUTION ORAL
Status: DISCONTINUED | OUTPATIENT
Start: 2022-10-23 | End: 2022-10-24 | Stop reason: HOSPADM

## 2022-10-23 RX ADMIN — MORPHINE SULFATE 10 MG: 10 SOLUTION ORAL at 10:56

## 2022-10-23 NOTE — PROGRESS NOTES
Hospitalist Progress Note    Patient: Adriana Foley MRN: [id number]  CSN: [id number]    YOB: 1945  Age: 68 y.o. Sex: female    DOA: 10/21/2022 LOS:  LOS: 2 days          Chief Complaint:  SOB        Assessment/Plan   67 yo female on comfort care admitted for SOB, covid     1. COVID infection-minimal 02 requirement    2. COPD exacerbation-no wheezing noted this am    3. Sacral decubitus-pain with this, ordered roxanol PRN    4. Lung mass    5. Legal blindness     Much more alert and communicative today    Diet and supplements ordered        Supportive  palliation planned with transfer to hospice once social situation sorted out        Transition to hospice when appropriate at home of family-tentatively planned for Monday     Disposition :  Patient Active Problem List   Diagnosis Code    COPD (chronic obstructive pulmonary disease) ([facility] J44.9    Hypoxemia requiring supplemental oxygen R09.02, Z99.81    Hypotension I95.9    Smoker F17.200    Lung mass R91.8    Blindness H54.7    Hypoxia R09.02    COPD with acute exacerbation ([facility] J44.1    Hospital acquired PNA J18.9, Y95    Pulmonary mass R91.8    COVID-19 virus RNA test result positive at limit of detection U07.1       Subjective:    My bottom really hurts  Can I have something for the pain? She requests ensure to have TID    She has no new complaints otherwise    Review of systems:    Constitutional: denies fevers  Respiratory: denies SOB, cough  Cardiovascular: denies chest pain  Gastrointestinal: denies nausea, vomiting, diarrhea      Vital signs/Intake and Output:  Visit Vitals  BP (!) 106/59   Pulse 76   Temp 97.6 °F (36.4 °C)   Resp 18   Ht 5' (1.524 m)   Wt 49.9 kg (110 lb)   SpO2 97%   BMI 21.48 kg/m²     Current Shift:  No intake/output data recorded.   Last three shifts:  10/21 1901 - 10/23 0700  In: 500 [I.V.:500]  Out: -     Exam:    General: elderly frail WF blind, alert, oriented NAD  CVS:Regular rate and rhythm, no M/R/G, S1/S2 heard, no thrill  Lungs:Clear to auscultation bilaterally, no wheezes, rhonchi, or rales  Abdomen: Soft, Nontender, No distention, Normal Bowel sounds  Extremities: No C/C/E, pulses palpable 2+  Neuro:grossly normal , follows commands  Psych:appropriate                Labs: Results:       Chemistry Recent Labs     10/22/22  0304 10/21/22  1845   * 200*    139   K 3.9 4.0    102   CO2 27 34*   BUN 16 18   CREA 0.87 0.83   CA 8.6 9.2   AGAP 9 3   BUCR 18 22*      CBC w/Diff Recent Labs     10/21/22  1845   WBC 6.1   RBC 3.18*   HGB 11.1*   HCT 34.4*      GRANS 56   LYMPH 28   EOS 2      Cardiac Enzymes No results for input(s): CPK, CKND1, PAULIE in the last 72 hours. No lab exists for component: CKRMB, TROIP   Coagulation No results for input(s): PTP, INR, APTT, INREXT in the last 72 hours. Lipid Panel No results found for: CHOL, CHOLPOCT, CHOLX, CHLST, CHOLV, 709072, HDL, HDLP, LDL, LDLC, DLDLP, 005065, VLDLC, VLDL, TGLX, TRIGL, TRIGP, TGLPOCT, CHHD, CHHDX   BNP No results for input(s): BNPP in the last 72 hours. Liver Enzymes No results for input(s): TP, ALB, TBIL, AP in the last 72 hours.     No lab exists for component: SGOT, GPT, DBIL   Thyroid Studies No results found for: T4, T3U, TSH, TSHEXT     Procedures/imaging: see electronic medical records for all procedures/Xrays and details which were not copied into this note but were reviewed prior to creation of Wilbur Larsen MD

## 2022-10-24 VITALS
WEIGHT: 107.1 LBS | DIASTOLIC BLOOD PRESSURE: 53 MMHG | SYSTOLIC BLOOD PRESSURE: 99 MMHG | OXYGEN SATURATION: 91 % | HEIGHT: 60 IN | HEART RATE: 89 BPM | TEMPERATURE: 98.1 F | RESPIRATION RATE: 19 BRPM | BODY MASS INDEX: 21.03 KG/M2

## 2022-10-24 LAB
ATRIAL RATE: 111 BPM
CALCULATED P AXIS, ECG09: 73 DEGREES
CALCULATED R AXIS, ECG10: 60 DEGREES
CALCULATED T AXIS, ECG11: 70 DEGREES
DIAGNOSIS, 93000: NORMAL
P-R INTERVAL, ECG05: 140 MS
Q-T INTERVAL, ECG07: 304 MS
QRS DURATION, ECG06: 86 MS
QTC CALCULATION (BEZET), ECG08: 413 MS
VENTRICULAR RATE, ECG03: 111 BPM

## 2022-10-24 PROCEDURE — 74011250637 HC RX REV CODE- 250/637: Performed by: HOSPITALIST

## 2022-10-24 PROCEDURE — 74011250636 HC RX REV CODE- 250/636: Performed by: INTERNAL MEDICINE

## 2022-10-24 RX ORDER — LORAZEPAM 2 MG/ML
1 CONCENTRATE ORAL
Qty: 30 ML | Refills: 0 | Status: SHIPPED | OUTPATIENT
Start: 2022-10-24

## 2022-10-24 RX ORDER — MORPHINE SULFATE 20 MG/ML
10 SOLUTION ORAL
Qty: 118 ML | Refills: 0 | Status: SHIPPED | OUTPATIENT
Start: 2022-10-24 | End: 2022-10-27

## 2022-10-24 RX ADMIN — MORPHINE SULFATE 2 MG: 2 INJECTION, SOLUTION INTRAMUSCULAR; INTRAVENOUS at 09:18

## 2022-10-24 RX ADMIN — MORPHINE SULFATE 10 MG: 10 SOLUTION ORAL at 17:41

## 2022-10-24 NOTE — PROGRESS NOTES
Problem: Nutrition Deficits  Goal: Optimize nutrtional status  Outcome: Progressing Towards Goal     Problem: Pressure Injury - Risk of  Goal: *Prevention of pressure injury  Description: Document Davi Scale and appropriate interventions in the flowsheet.   Outcome: Progressing Towards Goal  Note: Pressure Injury Interventions:  Sensory Interventions: Assess changes in LOC, Float heels, Keep linens dry and wrinkle-free, Pressure redistribution bed/mattress (bed type)    Moisture Interventions: Absorbent underpads, Internal/External urinary devices, Minimize layers, Moisture barrier    Activity Interventions: Pressure redistribution bed/mattress(bed type)    Mobility Interventions: Pressure redistribution bed/mattress (bed type), HOB 30 degrees or less, Float heels    Nutrition Interventions: Document food/fluid/supplement intake    Friction and Shear Interventions: Apply protective barrier, creams and emollients, HOB 30 degrees or less, Lift sheet, Minimize layers

## 2022-10-24 NOTE — PROGRESS NOTES
0013  Bedside and verbal shift change report given to Miguel Restrepo RN (on coming nurse) by Marina Rivero RN (off going nurse). Report included the following information SBAR, Kardex, Intake/Output and MAR.    0737  Bedside and verbal shift change report given by AARON Quiroz (off going nurse) to Peck, RN(on coming nurse). Report included the following information SBAR, Kardex, Intake/Output and MAR.

## 2022-10-24 NOTE — PROGRESS NOTES
D/C Plan: Home with [id number] [address] spoke with pt's family, Amber Mikki ([phone]), to discuss care transition. Family has confirmed plan for pt to transition home today. Family has requested a 4:30pm/5:00pm transition home today with the above services. CM has confirmed pt's address. Anticipate pt will transition home today. No other care transition of care needs have been identified. CM to continue to follow and assist as needed.     Care Management Interventions  Transition of Care Consult (CM Consult): Discharge Planning, [place]: No  Reason Outside [place]: Patient already serviced by other home care/hospice agency  Support Systems: Child(dominik), Other Family Member(s)  Confirm Follow Up Transport:  (medical transport)  The Plan for Transition of Care is Related to the Following Treatment Goals : COPD  The Patient and/or Patient Representative was Provided with a Choice of Provider and Agrees with the Discharge Plan?: Yes  Name of the Patient Representative Who was Provided with a Choice of Provider and Agrees with the Discharge Plan: robert Cardoso  [place] of Choice List was Provided with Basic Dialogue that Supports the Patient's Individualized Plan of Care/Goals, Treatment Preferences and Shares the Quality Data Associated with the Providers?: Yes  Discharge Location  Patient Expects to be Discharged to[de-identified]  (home with hospice)

## 2022-10-24 NOTE — PROGRESS NOTES
Discharge orders received, IV removed, patient alert, to be transported home via medical transport at this time. Patient medicated for pain per [facility] before leaving facility.

## 2022-10-24 NOTE — ROUTINE PROCESS
Bedside shift change report given to Saul Madden RN (oncoming nurse) by Zen Feng RN   (offgoing nurse). Report included the following information SBAR, Kardex, Intake/Output, and Recent Results.

## 2022-10-24 NOTE — PROGRESS NOTES
Problem: Airway Clearance - Ineffective  Goal: Achieve or maintain patent airway  Outcome: Progressing Towards Goal     Problem: Gas Exchange - Impaired  Goal: Absence of hypoxia  Outcome: Progressing Towards Goal  Goal: Promote optimal lung function  Outcome: Progressing Towards Goal     Problem: Breathing Pattern - Ineffective  Goal: Ability to achieve and maintain a regular respiratory rate  Outcome: Progressing Towards Goal     Problem: Body Temperature -  Risk of, Imbalanced  Goal: Ability to maintain a body temperature within defined limits  Outcome: Progressing Towards Goal  Goal: Will regain or maintain usual level of consciousness  Outcome: Progressing Towards Goal  Goal: Complications related to the disease process, condition or treatment will be avoided or minimized  Outcome: Progressing Towards Goal     Problem: Isolation Precautions - Risk of Spread of Infection  Goal: Prevent transmission of infectious organism to others  Outcome: Progressing Towards Goal     Problem: Nutrition Deficits  Goal: Optimize nutrtional status  Outcome: Progressing Towards Goal     Problem: Risk for Fluid Volume Deficit  Goal: Maintain normal heart rhythm  Outcome: Progressing Towards Goal  Goal: Maintain absence of muscle cramping  Outcome: Progressing Towards Goal  Goal: Maintain normal serum potassium, sodium, calcium, phosphorus, and pH  Outcome: Progressing Towards Goal     Problem: Loneliness or Risk for Loneliness  Goal: Demonstrate positive use of time alone when socialization is not possible  Outcome: Progressing Towards Goal     Problem: Fatigue  Goal: Verbalize increase energy and improved vitality  Outcome: Progressing Towards Goal     Problem: Patient Education: Go to Patient Education Activity  Goal: Patient/Family Education  Outcome: Progressing Towards Goal     Problem: Falls - Risk of  Goal: *Absence of Falls  Description: Document Lynne Fall Risk and appropriate interventions in the flowsheet.   Outcome: Progressing Towards Goal  Note: Fall Risk Interventions:            Medication Interventions: Patient to call before getting OOB    Elimination Interventions: Call light in reach, Toileting schedule/hourly rounds              Problem: Patient Education: Go to Patient Education Activity  Goal: Patient/Family Education  Outcome: Progressing Towards Goal     Problem: Pressure Injury - Risk of  Goal: *Prevention of pressure injury  Description: Document Davi Scale and appropriate interventions in the flowsheet.   Outcome: Progressing Towards Goal     Problem: Patient Education: Go to Patient Education Activity  Goal: Patient/Family Education  Outcome: Progressing Towards Goal

## 2022-10-24 NOTE — DISCHARGE SUMMARY
[address] [facility] SUMMARY    Name:  Bereket Mccracken  MR#:   [id number]  :  1945  ACCOUNT #:  [de-identified]  ADMIT DATE:  10/21/2022  DISCHARGE DATE:  10/24/2022      DISPOSITION:  The patient is going home with home hospice. HOSPITAL COURSE:  The patient is a 70-year-old female who was admitted to the hospital on 10/21/2022 and is being discharged on 10/24/2022. She is an elderly lady who has a lung mass and developed COVID at a nursing facility which made her more hypoxic. She was in the process of transitioning to home, but due to the hypoxia and infection, she was transitioned to the emergency room, where we then admitted her for stability over the weekend. Fortunately, her blood pressure, vital signs, and oxygen level had been stable. She is on 2-3 L nasal cannula. She is not in respiratory distress. She is still able to eat. She is a debilitated  female whose most recent vital signs are, blood pressure 108/52, pulse 89, temperature 97.9, respiratory rate 18, and she is 94% on 3 L. Again, the family already had planned to transition her home with hospice. Apparently, one of her primary caregivers was hospitalized, so that caused a little bit of diversion, but at this point, her family is ready for her to come home and caregivers have been working with Case Management. H and H is stable at 11.1 and 34.4. The patient is not in respiratory distress from the [facility]. Her last metabolic panel was unremarkable. Her chest x-ray on admission to the hospital here showed consolidation throughout the right lung with no clearly new or active cardiopulmonary disease, and she is known to have a lung mass apparently in her history. She had recently been followed by Pulmonology and had a bronchoscopy as well.   So with that, the patient is stable for release from the hospital today since hospice arrangements had been made for her at home which was the original plan prior to this admission. No new recommendations at this point in time. She is stable for transition and transfer via ambulance to home. If necessary, I have called in prescriptions for her hospice transition, isolation for COVID-19 in the interim, and the patient is able to tolerate Ensure or Boost three times daily and a regular diet otherwise. She has a Do Not Resuscitate in place.       Fabricio Gonzales MD      [id number]  D:  10/24/2022 14:50  T:  10/24/2022 16:16  JOB #:  [id number]

## 2022-10-25 ENCOUNTER — PATIENT OUTREACH (OUTPATIENT)
Dept: CASE MANAGEMENT | Age: 77
End: 2022-10-25

## 2022-10-27 LAB
BACTERIA SPEC CULT: NORMAL
BACTERIA SPEC CULT: NORMAL
SERVICE CMNT-IMP: NORMAL
SERVICE CMNT-IMP: NORMAL

## 2023-01-19 NOTE — PROGRESS NOTES
Pt needs home with oxygen. Discussed with her daughter about provide 24 hr supervision after d/ home. She is a smoker and discussed with her and daughter- it will be dangerous when she smokes while oxygen. Daughter reported that pt was r/c home with oxygen before and she weaned her self in 2-3 days. She did not smoke when she was on oxygen. Daughter will watch patient after discharge or will have somebody with pt while she is not at home. Her daughter is a RN. medications/rest

## 2024-08-26 NOTE — PROGRESS NOTES
Department of Anesthesiology  Postprocedure Note    Patient: Althea Manzo  MRN: [id number]  YOB: 2001  Date of evaluation: 8/26/2024    Procedure Summary       Date: 08/26/24 Room / Location: [facility] OR 01 / [facility]    Anesthesia Start: 0834 Anesthesia Stop: 0850    Procedure: COLONOSCOPY DIAGNOSTIC Diagnosis:       Diarrhea, unspecified type      Rectal bleeding      (Diarrhea, unspecified type [R19.7])      (Rectal bleeding [K62.5])    Surgeons: Rene Arzate MD Responsible Provider: Dilia Mercado APRN - CRNA    Anesthesia Type: MAC ASA Status: 2            Anesthesia Type: No value filed.    Mukesh Phase I: Mukesh Score: 10    Mukesh Phase II:      Anesthesia Post Evaluation    Patient location during evaluation: PACU  Level of consciousness: awake  Pain score: 0  Airway patency: patent  Nausea & Vomiting: no vomiting and no nausea  Cardiovascular status: hemodynamically stable  Respiratory status: acceptable  Hydration status: stable  Pain management: adequate and satisfactory to patient        No notable events documented.   Offered oral fluids and snack, no difficulty with swallowing noted, no BM noted. Female external cath attached to wall suction with clear harika urine noted. Noted congested non-productive cough. 0025 Verbal shift change report given to Geovanni Kay RN (oncoming nurse) by Gabby Alcantara RN   (offgoing nurse). Report included the following information SBAR, Kardex, Intake/Output, and MAR.

## (undated) DEVICE — GARMENT,MEDLINE,DVT,INT,CALF,MED, GEN2: Brand: MEDLINE

## (undated) DEVICE — GOWN ISOL IMPERV UNIV, DISP, OPEN BACK, BLUE --

## (undated) DEVICE — WRISTBAND ID AD W2.5XL9.5CM RED VYN ADH CLSR UNI-PRINT

## (undated) DEVICE — CONTAINER PREFIL FRMLN 40ML --

## (undated) DEVICE — NDL BX EXCELON 21GX130CM --

## (undated) DEVICE — WANG TRANSBRONCHIAL HISTOLOGY NEEDLE FOR CENTRAL REGION, 1.9 MM X 130 CM: Brand: WANG

## (undated) DEVICE — BASIN EMESIS 500CC ROSE 250/CS 60/PLT: Brand: MEDEGEN MEDICAL PRODUCTS, LLC

## (undated) DEVICE — MEDI-VAC SUCTION HIGH CAPACITY: Brand: CARDINAL HEALTH

## (undated) DEVICE — SINGLE USE BIOPSY VALVE MAJ-210: Brand: SINGLE USE BIOPSY VALVE (STERILE)

## (undated) DEVICE — SPONGE GZ W4XL4IN RAYON POLY 4 PLY NONWOVEN FASTER WICKING

## (undated) DEVICE — SYR 3ML LL TIP 1/10ML GRAD --

## (undated) DEVICE — SINGLE USE ASPIRATION NEEDLE: Brand: SINGLE USE ASPIRATION NEEDLE

## (undated) DEVICE — ENDO CARRY-ON PROCEDURE KIT INCLUDES ENZYMATIC SPONGE, GAUZE, BIOHAZARD LABEL, TRAY, LUBRICANT, DIRTY SCOPE LABEL, WATER LABEL, TRAY, DRAWSTRING PAD, AND DEFENDO 4-PIECE KIT.: Brand: ENDO CARRY-ON PROCEDURE KIT

## (undated) DEVICE — SYR 5ML 1/5 GRAD LL NSAF LF --

## (undated) DEVICE — MAILER SLDE MICSCP 2 PLC --

## (undated) DEVICE — SET ADMIN 16ML TBNG L100IN 2 Y INJ SITE IV PIGGY BK DISP

## (undated) DEVICE — FCPS BX RAD JAW 3 1.8MM --

## (undated) DEVICE — SYSTEM SPUT COLL 50ML CONIC CNTRFUG TB CONT W/ DURABLE 3

## (undated) DEVICE — SLIDE MICRO PLAIN PRECLEAND --

## (undated) DEVICE — SYRINGE MED 3ML NDL 22GA L1 1/2IN REG BVL SFGLDE

## (undated) DEVICE — TRAP SUC MUCOUS 70ML -- MEDICHOICE MEDLINE

## (undated) DEVICE — SYRINGE 50ML E/T

## (undated) DEVICE — BRUSH CYTO BRONCHSCP 1.5/140MM -- CELLEBRITY

## (undated) DEVICE — CANNULA CUSH AD W/ 14FT TBG

## (undated) DEVICE — NDL PRT INJ NSAF BLNT 18GX1.5 --

## (undated) DEVICE — REAG CYTO FIX 4OZ PMP SPRY --

## (undated) DEVICE — TRNQT TEXT 1X18IN BLU LF DISP -- CONVERT TO ITEM 362165

## (undated) DEVICE — MASK SURG REG ORNG LEV 3 SFTY SEAL 4 LAYR SFT INNR LINING

## (undated) DEVICE — CATH IV SAFE STR 22GX1IN BLU -- PROTECTIV PLUS

## (undated) DEVICE — SLEEVE COMPR STD 12 IN FOR 165IN CALF COMFORT VENODYNE SYS

## (undated) DEVICE — NDL FLTR TIP 5 MIC 18GX1.5IN --

## (undated) DEVICE — SYR 10ML SLIP TIP 1/5ML GRAD --

## (undated) DEVICE — ADAPTER TBNG DIA15MM SWVL FBROPT BRONCHSCP TERM 2 AXIS PEEP

## (undated) DEVICE — BITE BLOCK ENDOSCP UNIV AD 6 TO 9.4 MM

## (undated) DEVICE — TUBING, SUCTION, 1/4" X 12', STRAIGHT: Brand: MEDLINE

## (undated) DEVICE — KENDALL RADIOLUCENT FOAM MONITORING ELECTRODE RECTANGULAR SHAPE: Brand: KENDALL

## (undated) DEVICE — SINGLE USE SUCTION VALVE MAJ-209: Brand: SINGLE USE SUCTION VALVE (STERILE)

## (undated) DEVICE — MAJ-1414 SINGLE USE ADPATER BIOPSY VALV: Brand: SINGLE USE ADAPTOR BIOPSY VALVE

## (undated) DEVICE — 1860S HEALTH CARE RESPIRATOR N95 120EA/C: Brand: 3M™

## (undated) DEVICE — NEBULIZER,KIT,T-MOUTHPIECE,6"RESER,7'TUB: Brand: MEDLINE

## (undated) DEVICE — AIRLIFE™ NASAL OXYGEN CANNULA CURVED, FLARED TIP WITH 14 FOOT (4.3 M) CRUSH-RESISTANT TUBING, OVER-THE-EAR STYLE: Brand: AIRLIFE™

## (undated) DEVICE — CATH SUC CTRL PRT TRIFLO 14FR --

## (undated) DEVICE — MEDI-VAC NON-CONDUCTIVE SUCTION TUBING: Brand: CARDINAL HEALTH

## (undated) DEVICE — TOWEL,OR,DSP,ST,BLUE,STD,4/PK,20PK/CS: Brand: MEDLINE

## (undated) DEVICE — FCPS BIOP PULM RAD JAW 100CML -- BX/10 M00515180

## (undated) DEVICE — 1860 HEALTH CARE N95 MASK, 20EACH/BOX  6 BX/C: Brand: 3M™

## (undated) DEVICE — SOLUTION IV 500ML 0.9% SOD CHL FLX CONT

## (undated) DEVICE — FLEX ADVANTAGE 3000CC: Brand: FLEX ADVANTAGE

## (undated) DEVICE — CATHETER SUCT TR FL TIP 14FR W/ O CTRL

## (undated) DEVICE — SINGLE PORT MANIFOLD: Brand: NEPTUNE 2

## (undated) DEVICE — APPLICATOR FBR TIP L6IN COT TIP WOOD SHFT SWAB 2000 PER CA

## (undated) DEVICE — TRAP SPEC COLL POLYP POLYSTYR --

## (undated) DEVICE — SPONGE GZ W4XL4IN COT 12 PLY TYP VII WVN C FLD DSGN

## (undated) DEVICE — SET ADMIN L104IN 20 GTT GRAV RLER CLMP SMRT SITE NDL FREE

## (undated) DEVICE — BE 105-8 BRONCHOSCOPE SWIVEL - 15MM ID/22MM OD (PATIENT PORT) X15MM OD (EQUIPMENT PORT). REUSABLE.  FITS COMPONENTS OF ADULT VENTILATOR CIRCUITS.  MOLDED OF POLYETHERIMIDE. INCLUDES TWO SILICONE RUBBER CAPS; ONE CAP ALLOWS FOR THE USE OF A SUCTIONING CATHETER WHILE THE OTHER CAP ALLOWS FOR THE USE OF A FIBER-OPTIC BRONCHOSCOPE WITHOUT SIGNIFICANT LOSS OF PEEP.: Brand: BE 105-8 BRONCHOSCOPE SWIVEL

## (undated) DEVICE — SYR 20ML LL STRL LF --

## (undated) DEVICE — AIRLIFE™ ADULT OXYGEN MASK VINYL, UNDER-THE-CHIN STYLE, MEDIUM CONCENTRATION MASK WITH 7 FEET (2.1 M) CRUSH-RESISTANT OXYGEN TUBING: Brand: AIRLIFE™

## (undated) DEVICE — GLOVE ORANGE PI 7 1/2   MSG9075

## (undated) DEVICE — BLADE, TONGUE, 6", STERILE: Brand: MEDLINE